# Patient Record
Sex: FEMALE | Race: WHITE | Employment: FULL TIME | URBAN - NONMETROPOLITAN AREA
[De-identification: names, ages, dates, MRNs, and addresses within clinical notes are randomized per-mention and may not be internally consistent; named-entity substitution may affect disease eponyms.]

---

## 2023-05-25 LAB
EXTERNAL CHLAMYDIA SCREEN: NEGATIVE
EXTERNAL GONORRHEA SCREEN: NEGATIVE
EXTERNAL HEPATITIS B SURFACE ANTIGEN: NEGATIVE
EXTERNAL RUBELLA QUALITATIVE: NORMAL
HIV1 P24 AG SERPL QL IA: NEGATIVE

## 2023-08-23 ENCOUNTER — HOSPITAL ENCOUNTER (OUTPATIENT)
Age: 33
Discharge: HOME | End: 2023-08-23
Attending: NURSE PRACTITIONER | Admitting: NURSE PRACTITIONER
Payer: COMMERCIAL

## 2023-08-23 VITALS
TEMPERATURE: 97.3 F | BODY MASS INDEX: 28.34 KG/M2 | SYSTOLIC BLOOD PRESSURE: 111 MMHG | DIASTOLIC BLOOD PRESSURE: 65 MMHG | WEIGHT: 187 LBS | HEIGHT: 68 IN | RESPIRATION RATE: 16 BRPM | HEART RATE: 83 BPM | OXYGEN SATURATION: 99 %

## 2023-08-23 PROBLEM — R05.9 COUGH: Status: ACTIVE | Noted: 2023-08-23

## 2023-08-23 LAB
FLUAV AG NPH QL: NEGATIVE
FLUBV AG NPH QL: NEGATIVE
SARS-COV-2 RDRP RESP QL NAA+PROBE: NOT DETECTED

## 2023-08-23 PROCEDURE — 87804 INFLUENZA ASSAY W/OPTIC: CPT

## 2023-08-23 PROCEDURE — 87635 SARS-COV-2 COVID-19 AMP PRB: CPT

## 2023-08-23 PROCEDURE — 99212 OFFICE O/P EST SF 10 MIN: CPT

## 2023-08-24 NOTE — FLOWSHEET NOTE
Discharge instructions reviewed with patient, all questions answered, verbalized understanding. [facility] office phone number to provided for patient to establish care.

## 2023-08-24 NOTE — FLOWSHEET NOTE
Pt here with complaints of congestion and abdominal pain when she coughs. Pt denies any contractions, leaking of fluid or vaginal bleeding, and states she has an anterior placenta so she doesn't feel baby move as much as her other pregnancy. Pt denies any current pain and says she just wanted to get everything checked out since there was a pain in lower abd when she coughed at home. Report called to Hanane Valverde CNM. List of over the counter medications provided for congestion and cough.

## 2023-08-24 NOTE — DISCHARGE INSTRUCTIONS
LABOR AND DELIVERY - OBSERVATION DISCHARGE INSTRUCTIONS    TERM LABOR: RETURN TO HOSPITAL IF:  __There is a leaking or sudden gush of fluid from the vagina (note color, odor, time and amount of fluid)    __ You have bright red vaginal bleeding    __You begin to have regular contractions, occuring every *** minutes, each contraction lasting 45-60 seconds for one hour. Time contractions from beginning of one to the beginning of the next. True contractions have a regular rate and become progressively closer. They are not changed by position change and are usually more uncomfortable when walking. PRE-TERM LABOR  __Your gestational age is *** weeks: term pregnancy starts at 37 weeks. __Fill your prescription and take as directed. __Bed rest (left lying position is best). __Refrain from sexual intercourse until you see your physician again. __No heavy lifting or strenuous activity. RETURN TO HOSPITAL IF:  __Contractions occur 3-4 in any hour (every 10-15 minutes), maybe with or without pain. __Rhythmic or constant menstrual-like cramps  __Rhythmic or constant lower, dull backache may radiate to the sides or front. Increase or change in vaginal discharge, may be watery, pink, red or brown-tinged. PRE-ECLAMPSIA  __Bed rest, left side lying position  __Elevate legs while sitting  __Maintain quiet, peaceful environment  __Eat well-balanced meals, no added salt, avoid processed lunch meats, canned soups, potato chips, etc.    SYMPTOMS THAT REQUIRE PROMPT REPORTING:  __Rapid gain in weight  __Persistent or severe headache  __Visual disturbances (spots, blurred)  __Nausea/vomiting, with or without upper abdominal pain.   __Increase or persistent swelling (face puffiness, hands, legs, ankles)  __Decreased urinary output  __Drowsiness listlessness    NAUSEA/VOMITING HYPEREMESIS  __Eat small, frequent meals instead of three large meals  __Drink liquids (such as 7-up or ginger ale) between meals instead of with meals  __Eat a few crackers or toast before getting out of bed in the morning    URINARY TRACT INFECTION  __Fill your prescription and take as directed  __Drink 8-10 glasses of water, juice or decaffeinated beverages a day. __Take two regular strength Tylenol every 4 hours as needed for pain or fever (NO ASPIRIN PRODUCTS)  __Cleanse vaginal area from front to back  __Completely empty bladder and avoid postponing urination  __Notify your physician of elevated temperature      LOW LYING/MARGINAL PLACENTA PREVIA  __No sexual intercourse  __No douching or tampon use  __No heavy lifting or strenuous activity  __No long trips without physician's consent  __Limited activity for __________________    RETURN TO HOSPITAL IMMEDIATELY IF:  __Vaginal bleeding, usually bright red and painless. May be intermittent, may come in gushes or continuous. ABDOMINAL POST-TRAUMA INSTRUCTIONS  __Limited activity for __hours  __It is important to note baby activity, evidence on pre-term labor or bleeding and tight feeling abdomen  __If you notice a decrease in fetal movement , notify your physician  __Return to hospital IMMEDIATELY if vaginal bleeding starts, abdominal tenderness, or pain, a rigid/board-like appearance of abdomen    OTHER INSTRUCTIONS  _X_Make an appointment to see your attending physician as soon as possible to establish care.        NOTE: If you do not begin to feel better, or you have any questions, contact your physician or call [phone], or return to the hospital.

## 2023-09-24 ENCOUNTER — HOSPITAL ENCOUNTER (OUTPATIENT)
Facility: HOSPITAL | Age: 33
Discharge: HOME | End: 2023-09-24
Attending: OBSTETRICS & GYNECOLOGY | Admitting: OBSTETRICS & GYNECOLOGY
Payer: MEDICAID

## 2023-09-24 ENCOUNTER — HOSPITAL ENCOUNTER (EMERGENCY)
Facility: HOSPITAL | Age: 33
Discharge: STILL ADMITTED OR ADMINISTRATIVE | End: 2023-09-24

## 2023-09-24 VITALS
WEIGHT: 188 LBS | TEMPERATURE: 97.2 F | HEART RATE: 92 BPM | RESPIRATION RATE: 18 BRPM | HEIGHT: 68 IN | DIASTOLIC BLOOD PRESSURE: 67 MMHG | OXYGEN SATURATION: 97 % | BODY MASS INDEX: 28.49 KG/M2 | SYSTOLIC BLOOD PRESSURE: 109 MMHG

## 2023-09-24 PROCEDURE — G0463 HOSPITAL OUTPT CLINIC VISIT: HCPCS

## 2023-09-24 PROCEDURE — 59025 FETAL NON-STRESS TEST: CPT

## 2023-09-24 PROCEDURE — G0378 HOSPITAL OBSERVATION PER HR: HCPCS

## 2023-09-24 RX ORDER — PRENATAL VIT NO.126/IRON/FOLIC 28MG-0.8MG
TABLET ORAL DAILY
COMMUNITY

## 2023-09-24 NOTE — NURSING NOTE
[facility] Medical Screening Exam Score Card    2023              BOX A -  Checklist findings CRITERIA Score  BOX C -   Checklist 1st Exam Score CRITERIA   Headache  No Yes = 1 0  Dilation 0 0-3 cm = 1  4-7 cm = 2  8-10 cm = 3   Vomiting No Yes = 1 0  Effacement 0 Greater than 50% = 2        Membranes:  TIME:  ROM 0 Ruptured = 3  Greater than 12 hours = 5   Visual Difficulties No Yes = 1 0  Contractions        Frequency 0 Less than 5 minutes   = 2    Greater than 5 minutes = 1   Epigastric Pain no Yes = 1 0  Duration 0 Greater than 40 seconds = 2        Intensity 0 Strong = 1   TOTAL Box A 3 or more = physician or CNM exam 1  Pattern 0 Regular = 1   BOX B -   Checklist Findings CRITERIA Score  Urge to Push 0 Yes = 3   PARA Yes If in labor and   Para 11 plus = 1 0  Maternal Temp 0 Greater than 100.4 = 5   Duration last labor less than 3 hours No If in labor and   Yes = 3 0  Maternal BP 0 Greater than 140/90 = 5  OR Less than 90/50 = 5   Prior  Yes If in labor and  Yes = 1 1  Maternal Respiration 0 Less than 12 = 2  OR  Greater than 20 = 2   Prenatal Care Yes If in labor and  No = 3 0            Significant Medical History 0 Yes = 7   Prior Fetal Demise No If in labor and  Yes = 3 0  Maternal Trauma 0 Yes = 10        [place] Bleeding 0 Yes = 7   Multiple Gestation No If in labor and  Yes = 3 0  Fetal Heart Rate 0 Baseline less than 120 = 5    OR Greater than 160 = 5    Non-reassuring strip = 10   Cerclage, Incompetent Cervix/Uterus No If in labor and  Yes = 3 0       Urinalysis No If greater than  100 = 1 0  Fetal Position 0 Non-vertex = 3  Prolapsed part = 10   Gestational Age Yes 20-34 weeks = 3    34-36 weeks = 2    37 plus weeks = 0 3  Fetal Station 0 Greater than 0 station = 3        TOTAL Box C 0 10 or more = physician or CNM exam   TOTAL Box B 6 or more = physician or CNM exam 5         Reviewed with obstetrician and orders received.      Anamaria Pradhan RN  2023  11:37 CDT

## 2023-09-24 NOTE — NURSING NOTE
Received with c/o decreased fm since 09/22/24 at 2100. FHT's 130's on external monitor. C/O pulled muscle Sept that is still sore at left lower rib edge.

## 2023-09-24 NOTE — NURSING NOTE
Alie Cleary, a  at 29w6d with an XENA of 2023, by Patient Reported, was seen at [facility] LABOR DELIVERY for a nonstress test.    Chief Complaint   Patient presents with    Decreased Fetal Movement     Hasn't felt baby move since Friday at 2100   States feeling baby move frequently after being placed on external monitors. Using electronic marker to sunshine fetal movements    There is no problem list on file for this patient.      Start Time: 1023  Stop Time: 1043    Interpretation A  Nonstress Test Interpretation A: Reactive  Comments A: verified by CAT Pradhan RNC VALERIE Rose RNC

## 2023-09-25 PROBLEM — Z34.90 PREGNANCY: Status: ACTIVE | Noted: 2023-09-25

## 2023-09-29 ENCOUNTER — INITIAL PRENATAL (OUTPATIENT)
Dept: OBSTETRICS AND GYNECOLOGY | Facility: CLINIC | Age: 33
End: 2023-09-29
Payer: MEDICAID

## 2023-09-29 VITALS — DIASTOLIC BLOOD PRESSURE: 68 MMHG | WEIGHT: 186 LBS | SYSTOLIC BLOOD PRESSURE: 104 MMHG | BODY MASS INDEX: 28.28 KG/M2

## 2023-09-29 DIAGNOSIS — O34.219 PREVIOUS CESAREAN DELIVERY, ANTEPARTUM: Primary | ICD-10-CM

## 2023-09-29 NOTE — PROGRESS NOTES
Transfer of care from [place], will request records  Prior delivery by , plans repeat  Good fetal movement  Mild reflux  Glucola and Hgb today  Offered flu vaccine  Schedule repeat  next visit ()  Tdap next visit   labor precautions    Diagnoses and all orders for this visit:    1. Previous  delivery, antepartum (Primary)  -     Gestational Screen 1 Hr ([facility])  -     Hemoglobin

## 2023-09-30 LAB
GLUCOSE 1H P 50 G GLC PO SERPL-MCNC: 127 MG/DL (ref 70–139)
HGB BLD-MCNC: 11.1 G/DL (ref 11.1–15.9)

## 2023-10-02 ENCOUNTER — REFERRAL TRIAGE (OUTPATIENT)
Dept: LABOR AND DELIVERY | Facility: HOSPITAL | Age: 33
End: 2023-10-02
Payer: MEDICAID

## 2023-10-02 ENCOUNTER — PATIENT OUTREACH (OUTPATIENT)
Dept: LABOR AND DELIVERY | Facility: HOSPITAL | Age: 33
End: 2023-10-02
Payer: MEDICAID

## 2023-10-02 NOTE — OUTREACH NOTE
[facility]  Unable to Reach       Questions/Answers      Flowsheet Row Responses   Pending Outreach Confirm Patient Interest   Call Attempt First   Outcome No answer/busy, Left message, [employer] message sent to patient                Sera Mcnally RN  Maternity Nurse Navigator    10/2/2023, 10:33 CDT

## 2023-10-02 NOTE — OUTREACH NOTE
[facility]  Enrollment    Current Estimated Gestational Age: 31w0d    Questions/Answers      Flowsheet Row Responses   Would like to participate? Yes   Date of Intake Visit 10/02/23            [facility]  Intake    Current Estimated Gestational Age: 31w0d    Intake Assessment      Flowsheet Row Responses   Best Method for Contacting Cell   Currently Employed Yes   Able to keep appointments as scheduled Yes   Gender(s) and Name(s) female   Do you have a dentist? No  [dental clinics sent in resource letter]   Resources Presently Utilizing: None   Maternal Warning Signs Provided  [attached to resource letter]   Other: Provided  [prenatal education sent in AVS from [employer]]   Other Education HANDS, How to find a dentist, How to find a pediatrician, How to find a primary care provider, Insurance benefits/Incentives, Meds to Beds, Mental Health Services, Smoking/Vaping Cessation, SNAP Benefits, Substance Use Disorder Treatment, Transportation Assistance, WIC Benefits            Learning Assessment      Flowsheet Row Responses   Relationship Patient   Does the learner have any barriers to learning? No Barriers   What is the preferred language of the learner for medical teaching? English   How does the learner prefer to learn new concepts? Listening, Reading, Demonstration, Pictures/Video            SDOH updated and reviewed with the patient during this program:  Financial Resource Strain: Medium Risk    Difficulty of Paying Living Expenses: Somewhat hard      Physical Activity: Insufficiently Active    Days of Exercise per Week: 5 days    Minutes of Exercise per Session: 10 min      Food Insecurity: Food Insecurity Present    Worried About Running Out of Food in the Last Year: Often true    Ran Out of Food in the Last Year: Often true      Social Connections: Not on file      Transportation Needs: No Transportation Needs    Lack of Transportation (Medical): No    Lack of Transportation (Non-Medical):  No      Housing Stability: Not on file      Stress: Not on file   Resource letter sent to client in [facility]       Referral submitted to the following resources (verbal consent received to submit demographic information):     HANDS    Tobacco, Alcohol, and Drug History     reports that she quit smoking about 7 months ago. Her smoking use included cigarettes. She has a 4.00 pack-year smoking history. She has never used smokeless tobacco.   reports that she does not currently use alcohol.   reports that she does not currently use drugs after having used the following drugs: Marijuana.    Sera Mcnally RN  Maternity Nurse Navigator    10/2/2023, 13:43 CDT            Sera Mcnally RN  Maternity Nurse Navigator    10/2/2023, 13:43 CDT

## 2023-10-11 ENCOUNTER — ROUTINE PRENATAL (OUTPATIENT)
Dept: OBSTETRICS AND GYNECOLOGY | Facility: CLINIC | Age: 33
End: 2023-10-11
Payer: MEDICAID

## 2023-10-11 VITALS — WEIGHT: 186 LBS | SYSTOLIC BLOOD PRESSURE: 106 MMHG | DIASTOLIC BLOOD PRESSURE: 64 MMHG | BODY MASS INDEX: 28.28 KG/M2

## 2023-10-11 DIAGNOSIS — Z71.85 IMMUNIZATION COUNSELING: ICD-10-CM

## 2023-10-11 DIAGNOSIS — Z3A.32 32 WEEKS GESTATION OF PREGNANCY: Primary | ICD-10-CM

## 2023-10-11 DIAGNOSIS — Z34.83 MULTIGRAVIDA IN THIRD TRIMESTER: ICD-10-CM

## 2023-10-11 DIAGNOSIS — O34.219 PREVIOUS CESAREAN DELIVERY, ANTEPARTUM: ICD-10-CM

## 2023-10-11 LAB
GLUCOSE UR STRIP-MCNC: NEGATIVE MG/DL
PROT UR STRIP-MCNC: NEGATIVE MG/DL

## 2023-10-11 NOTE — PROGRESS NOTES
Reason for visit: Routine OB visit at 32w2d     CC:  Patient reports good fetal movement. Denies VB, LOF, contractions, h/a, visual changes, and right upper quadrant pain.     ROS: All systems reviewed and are negative    Wt 186 lb for a TWG of Not found., /64, FHTs 132, FH 32 cm  Urine today and reviewed: negative glucose, negative protein      Exam:  General Appearance:  Healthy appearing . Normal mood and behavior.  HEENT: NCAT, EOMI  HR str and reg. Lungs clear. Resp even and unlabored.  Abdomen is soft and nontender. Uterus is consistent with EGA  Ext: no edema, nontender, no trauma, or cyanosis.    Impression  Diagnoses and all orders for this visit:    1. 32 weeks gestation of pregnancy (Primary)  -     POC Urinalysis Dipstick    2. Multigravida in third trimester  - Discussed third trimester of pregnancy, discomforts and measures of support, fetal movement counts,  labor warnings, preeclampsia warnings, and signs and symptoms to report. Third Trimester of Pregnancy video included in the AVS.  - Discussed and encouraged to come to the hospital or call with vaginal bleeding, leaking of fluid, contractions, or other concerns.  - Return to the office in two weeks for routine OBV with Dr. Rios and as needed with concerns.    3. Previous  delivery, antepartum    4. Immunization counseling  - Discussed Tdap immunization recommendations during pregnancy. Patient declines the Tdap immunization during this clinic visit. Tdap (Tetanus, Diptheria, Pertussis) Vaccine: What You Need to Know (VIS) education included in the AVS.  - Discussed influenza vaccine recommendations during pregnancy. Patient declines to receive the influenza immunization today in the clinic. Influenza (Flu) Vaccine (Inactivated or Recombinant): What You Need to Know (VIS) education included in the AVS.          This note has been signed electronically.    Deandra Jimenez, DNP, APRN, CNM, RNC-OB

## 2023-10-25 ENCOUNTER — TELEPHONE (OUTPATIENT)
Dept: OBSTETRICS AND GYNECOLOGY | Facility: CLINIC | Age: 33
End: 2023-10-25
Payer: MEDICAID

## 2023-10-25 NOTE — TELEPHONE ENCOUNTER
Pt called regarding problems with heartburn/acid reflux. Pt states she has tried OTC Tums with no relief. Pt advised that she can take OTC Zantac or Pepcid. Pt voiced understanding and will discuss further with Dr. Rios if needed at her appt on 10/27/23

## 2023-10-27 ENCOUNTER — ROUTINE PRENATAL (OUTPATIENT)
Dept: OBSTETRICS AND GYNECOLOGY | Facility: CLINIC | Age: 33
End: 2023-10-27
Payer: MEDICAID

## 2023-10-27 VITALS — DIASTOLIC BLOOD PRESSURE: 84 MMHG | WEIGHT: 188 LBS | SYSTOLIC BLOOD PRESSURE: 112 MMHG | BODY MASS INDEX: 28.59 KG/M2

## 2023-10-27 DIAGNOSIS — O34.219 PREVIOUS CESAREAN DELIVERY, ANTEPARTUM: Primary | ICD-10-CM

## 2023-10-27 PROCEDURE — 99213 OFFICE O/P EST LOW 20 MIN: CPT | Performed by: OBSTETRICS & GYNECOLOGY

## 2023-10-27 NOTE — PROGRESS NOTES
Good fetal movement  Labor precautions  Declines flu vaccine  Tubal papers today  Schedule repeat  and BTL   Surg pack next visit    Diagnoses and all orders for this visit:    1. Previous  delivery, antepartum (Primary)  -     Case Request

## 2023-11-06 ENCOUNTER — PATIENT OUTREACH (OUTPATIENT)
Dept: LABOR AND DELIVERY | Facility: HOSPITAL | Age: 33
End: 2023-11-06
Payer: MEDICAID

## 2023-11-06 NOTE — OUTREACH NOTE
[facility]  Unable to Reach       Questions/Answers      Flowsheet Row Responses   Pending Outreach Prenatal Check-in   Call Attempt First   Outcome No answer/busy, [employer] message sent to patient            EPDS sent in [employer] questionnaire prior to check-in call this week.     Sera Mcnally RN  Maternity Nurse Navigator    11/6/2023, 12:10 CST

## 2023-11-08 ENCOUNTER — PATIENT OUTREACH (OUTPATIENT)
Dept: LABOR AND DELIVERY | Facility: HOSPITAL | Age: 33
End: 2023-11-08
Payer: MEDICAID

## 2023-11-08 NOTE — OUTREACH NOTE
[facility]  Unable to Reach       Questions/Answers      Flowsheet Row Responses   Pending Outreach Prenatal Check-in   Call Attempt First   Outcome No answer/busy, Left message                Sera Mcnally RN  Maternity Nurse Navigator    11/8/2023, 11:06 CST

## 2023-11-09 ENCOUNTER — PATIENT OUTREACH (OUTPATIENT)
Dept: LABOR AND DELIVERY | Facility: HOSPITAL | Age: 33
End: 2023-11-09
Payer: MEDICAID

## 2023-11-09 NOTE — OUTREACH NOTE
[facility]  Check-In    Current Estimated Gestational Age: 36w3d      Questions/Answers      Flowsheet Row Responses   Best Method for Contacting Cell   Currently Employed Yes   Able to keep appointments as scheduled Yes   Gender(s) and Name(s) female   Baby Active/Feeling Fetal Movemen Yes   How are you presently feeling? voices no complaints   Supplies ready for baby Breast Pump, Car Seat, Clothing, Crib, Diapers, Feeding Supplies   Resource/Environmental Concerns None   Do you have any questions related to your care experience, your pregnancy, plans for delivery, any concerns, etc? No          Resource letter sent to client in [facility] with the following resources attached:  Maternal warning signs  [place] care  [place] safety  1 in 5 rule of uterine contractions    Sera Mcnally RN  Maternity Nurse Navigator    2023, 15:06 CST

## 2023-11-10 ENCOUNTER — ROUTINE PRENATAL (OUTPATIENT)
Dept: OBSTETRICS AND GYNECOLOGY | Facility: CLINIC | Age: 33
End: 2023-11-10
Payer: MEDICAID

## 2023-11-10 VITALS — BODY MASS INDEX: 29.5 KG/M2 | WEIGHT: 194 LBS | DIASTOLIC BLOOD PRESSURE: 74 MMHG | SYSTOLIC BLOOD PRESSURE: 118 MMHG

## 2023-11-10 DIAGNOSIS — Z3A.36 36 WEEKS GESTATION OF PREGNANCY: Primary | ICD-10-CM

## 2023-11-10 DIAGNOSIS — Z34.83 MULTIGRAVIDA IN THIRD TRIMESTER: ICD-10-CM

## 2023-11-10 DIAGNOSIS — O34.219 PREVIOUS CESAREAN DELIVERY, ANTEPARTUM: ICD-10-CM

## 2023-11-10 DIAGNOSIS — R12 HEARTBURN DURING PREGNANCY IN THIRD TRIMESTER: ICD-10-CM

## 2023-11-10 DIAGNOSIS — O26.893 HEARTBURN DURING PREGNANCY IN THIRD TRIMESTER: ICD-10-CM

## 2023-11-10 LAB
GLUCOSE UR STRIP-MCNC: NEGATIVE MG/DL
PROT UR STRIP-MCNC: ABNORMAL MG/DL

## 2023-11-10 RX ORDER — PANTOPRAZOLE SODIUM 40 MG/1
40 TABLET, DELAYED RELEASE ORAL DAILY
Qty: 30 TABLET | Refills: 1 | Status: ON HOLD | OUTPATIENT
Start: 2023-11-10

## 2023-11-10 NOTE — PROGRESS NOTES
Reason for visit: Routine OB visit at 36w4d     CC:  Patient reports good fetal movement. Complaining of bad heartburn. She reports the over the counter medication is not working. Denies VB, LOF, contractions, h/a, visual changes, and right upper quadrant pain.     ROS: All systems reviewed and are negative with exception of the following: heartburn    Wt 194 lb for a TWG of Not found., /74, FHTs 141, FH 37 cm  Urine today and reviewed: Negative glucose, trace protein    Exam:  General Appearance:  Healthy appearing . Normal mood and behavior.  HEENT: NCAT, EOMI  HR str and reg. Lungs clear. Resp even and unlabored.  Abdomen is soft and nontender. Uterus is consistent with EGA  Ext: No edema, nontender, no trauma, or cyanosis.    Impression  Diagnoses and all orders for this visit:    1. 36 weeks gestation of pregnancy (Primary)  -     POC Urinalysis Dipstick    2. Multigravida in third trimester  - Discussed third trimester discomforts and measures of support, fetal movement counts, signs of labor, preeclampsia warnings, and signs and symptoms to report. Signs and Symptoms of Labor and video included in the AVS.  - Discussed and encouraged to come to the hospital or call with vaginal bleeding, leaking of fluid, regular contractions, or other concerns.  - Return to the office in one week for routine OBV with Dr. Rios and as needed with concerns.    3. Previous  delivery, antepartum  - Planning for repeat  delivery with bilateral salpingectomy. ERAS and hibiclens shower education provided. Handouts provided. Preparing for  Delivery video included in the AVS.     4. Heartburn during pregnancy in third trimester  - Discussed measures of support, including reflux precautions and prescription medication. She relates the Pepcid and Tums have become ineffective.   -     pantoprazole (Protonix) 40 MG EC tablet; Take 1 tablet by mouth Daily.  Dispense: 30 tablet; Refill: 1           This note has been signed electronically.    Deandra Jimenez, DNP, APRN, CNM, RNC-OB

## 2023-11-21 ENCOUNTER — ROUTINE PRENATAL (OUTPATIENT)
Dept: OBSTETRICS AND GYNECOLOGY | Facility: CLINIC | Age: 33
End: 2023-11-21
Payer: MEDICAID

## 2023-11-21 VITALS — WEIGHT: 190 LBS | SYSTOLIC BLOOD PRESSURE: 114 MMHG | BODY MASS INDEX: 28.89 KG/M2 | DIASTOLIC BLOOD PRESSURE: 78 MMHG

## 2023-11-21 DIAGNOSIS — O34.219 PREVIOUS CESAREAN DELIVERY, ANTEPARTUM: Primary | ICD-10-CM

## 2023-11-21 RX ORDER — ACETAMINOPHEN 500 MG
1000 TABLET ORAL ONCE
Status: CANCELLED | OUTPATIENT
Start: 2023-11-21 | End: 2023-11-21

## 2023-11-21 RX ORDER — HYDROMORPHONE HYDROCHLORIDE 1 MG/ML
0.5 INJECTION, SOLUTION INTRAMUSCULAR; INTRAVENOUS; SUBCUTANEOUS
Status: CANCELLED | OUTPATIENT
Start: 2023-11-21 | End: 2023-12-01

## 2023-11-21 RX ORDER — CARBOPROST TROMETHAMINE 250 UG/ML
250 INJECTION, SOLUTION INTRAMUSCULAR AS NEEDED
Status: CANCELLED | OUTPATIENT
Start: 2023-11-21

## 2023-11-21 RX ORDER — KETOROLAC TROMETHAMINE 15 MG/ML
30 INJECTION, SOLUTION INTRAMUSCULAR; INTRAVENOUS ONCE
Status: CANCELLED | OUTPATIENT
Start: 2023-11-21 | End: 2023-11-21

## 2023-11-21 RX ORDER — METHYLERGONOVINE MALEATE 0.2 MG/ML
200 INJECTION INTRAVENOUS ONCE AS NEEDED
Status: CANCELLED | OUTPATIENT
Start: 2023-11-21

## 2023-11-21 RX ORDER — FAMOTIDINE 10 MG/ML
20 INJECTION, SOLUTION INTRAVENOUS ONCE AS NEEDED
Status: CANCELLED | OUTPATIENT
Start: 2023-11-21

## 2023-11-21 RX ORDER — SODIUM CHLORIDE 9 MG/ML
40 INJECTION, SOLUTION INTRAVENOUS AS NEEDED
Status: CANCELLED | OUTPATIENT
Start: 2023-11-21

## 2023-11-21 RX ORDER — OXYTOCIN/0.9 % SODIUM CHLORIDE 30/500 ML
250 PLASTIC BAG, INJECTION (ML) INTRAVENOUS CONTINUOUS
Status: CANCELLED | OUTPATIENT
Start: 2023-11-21 | End: 2023-11-21

## 2023-11-21 RX ORDER — MISOPROSTOL 100 UG/1
800 TABLET ORAL ONCE AS NEEDED
Status: CANCELLED | OUTPATIENT
Start: 2023-11-21

## 2023-11-21 RX ORDER — OXYTOCIN/0.9 % SODIUM CHLORIDE 30/500 ML
999 PLASTIC BAG, INJECTION (ML) INTRAVENOUS ONCE
Status: CANCELLED | OUTPATIENT
Start: 2023-11-21

## 2023-11-21 RX ORDER — SODIUM CHLORIDE 0.9 % (FLUSH) 0.9 %
10 SYRINGE (ML) INJECTION AS NEEDED
Status: CANCELLED | OUTPATIENT
Start: 2023-11-21

## 2023-11-21 RX ORDER — FAMOTIDINE 10 MG
20 TABLET ORAL ONCE AS NEEDED
Status: CANCELLED | OUTPATIENT
Start: 2023-11-21

## 2023-11-21 RX ORDER — SODIUM CHLORIDE 0.9 % (FLUSH) 0.9 %
10 SYRINGE (ML) INJECTION EVERY 12 HOURS SCHEDULED
Status: CANCELLED | OUTPATIENT
Start: 2023-11-21

## 2023-11-21 RX ORDER — ONDANSETRON 2 MG/ML
4 INJECTION INTRAMUSCULAR; INTRAVENOUS EVERY 6 HOURS PRN
Status: CANCELLED | OUTPATIENT
Start: 2023-11-21

## 2023-11-21 RX ORDER — ONDANSETRON 4 MG/1
4 TABLET, FILM COATED ORAL EVERY 6 HOURS PRN
Status: CANCELLED | OUTPATIENT
Start: 2023-11-21

## 2023-11-21 RX ORDER — SODIUM CHLORIDE, SODIUM LACTATE, POTASSIUM CHLORIDE, CALCIUM CHLORIDE 600; 310; 30; 20 MG/100ML; MG/100ML; MG/100ML; MG/100ML
125 INJECTION, SOLUTION INTRAVENOUS CONTINUOUS
Status: CANCELLED | OUTPATIENT
Start: 2023-11-21

## 2023-11-21 NOTE — H&P
[facility]  Alie Cleary  : 1990  MRN: [id number]  CSN: [id number]    History and Physical    Subjective   Alie Cleary is a 33 y.o. year old  with an Estimated Date of Delivery: 23 scheduled on  for  delivery due to previous  section declines .  She is planning for sterilization at the time of the .    Prenatal care has been with Dr. Kenney Rios.  It has been benign.    OB History    Para Term  AB Living   4 1 1 0 2 1   SAB IAB Ectopic Molar Multiple Live Births   2 0 0 0 0 1      # Outcome Date GA Lbr Yordy/2nd Weight Sex Delivery Anes PTL Lv   4 Current            3 SAB 2017 3w0d          2 Term 09 42w0d   M CS-Unspec EPI N JOEY      Complications: Fetal Intolerance   1 SAB 08 8w0d      N      Past Medical History:   Diagnosis Date    Fibromyalgia      Past Surgical History:   Procedure Laterality Date     SECTION      MOUTH SURGERY         Current Outpatient Medications:     pantoprazole (Protonix) 40 MG EC tablet, Take 1 tablet by mouth Daily., Disp: 30 tablet, Rfl: 1    prenatal vitamin (prenatal, CLASSIC, vitamin) tablet, Take  by mouth Daily., Disp: , Rfl:   No family history on file.    No Known Allergies  Social History    Tobacco Use      Smoking status: Former        Packs/day: 0.25        Years: 16.00        Additional pack years: 0.00        Total pack years: 4.00        Types: Cigarettes        Quit date: 2023        Years since quittin.7      Smokeless tobacco: Never    Review of Systems      Objective   /78   Wt 86.2 kg (190 lb)   BMI 28.89 kg/m²   General: well developed; well nourished  no acute distress   Heart: regular rate and rhythm   Lungs: breathing is unlabored   Abdomen: soft, non-tender; no masses     Cervix:   presenting part vertex  Prenatal Labs  Lab Results   Component Value Date    HGB 11.1 2023       Recent Labs  Lab Results   Component Value Date    HGB 11.1  2023    HCT 40.3 2020    WBC 13.2 (H) 2020     2020           Assessment   IUP with an Estimated Date of Delivery: 23  Planned  section on  for previous  section declines .  Undesired fertility     Plan   Repeat  with bilateral tubal ligation      Risks, benefits, and alternatives to  section were discussed with the patient at  length.  The surgical nature of  section was discussed.  The indications for  section were discussed.  Risks of bleeding, infection, and damage to surrounding organs were reviewed.  Injury to blood vessels, the urinary bladder, the ureter, and the intestines were all reviewed.  Management of these complications were reviewed.    Risks, benefits, and alternatives of permanent sterilization were discussed with the patient in detail. Intraoperative risks of bleeding and damage to surrounding organs, including but not limited to intestine, bladder and ureter, were explained. Management of these were also explained. Postoperative complications such as infection, pneumonia, DVT, and bleeding were explained. The importance of compliance with postoperative restrictions was discussed.  Success and failure rates were discussed. Increased risk of ectopic pregnancy was explained. In addition, reversible forms of contraception were reviewed such as the pill, the patch, the shot, and the IUD.     All of the patient's questions were answered to her satisfaction. She was encouraged to return for an additional appointment if she had further questions. She verbalized understanding of the above and wished to proceed with the outlined plan.      Corky Rios MD  2023

## 2023-11-21 NOTE — H&P (VIEW-ONLY)
[facility]  Alie Cleary  : 1990  MRN: [id number]  CSN: [id number]    History and Physical    Subjective   Alie Celary is a 33 y.o. year old  with an Estimated Date of Delivery: 23 scheduled on  for  delivery due to previous  section declines .  She is planning for sterilization at the time of the .    Prenatal care has been with Dr. Kenney Rios.  It has been benign.    OB History    Para Term  AB Living   4 1 1 0 2 1   SAB IAB Ectopic Molar Multiple Live Births   2 0 0 0 0 1      # Outcome Date GA Lbr Yordy/2nd Weight Sex Delivery Anes PTL Lv   4 Current            3 SAB 2017 3w0d          2 Term 09 42w0d   M CS-Unspec EPI N JOEY      Complications: Fetal Intolerance   1 SAB 08 8w0d      N      Past Medical History:   Diagnosis Date    Fibromyalgia      Past Surgical History:   Procedure Laterality Date     SECTION      MOUTH SURGERY         Current Outpatient Medications:     pantoprazole (Protonix) 40 MG EC tablet, Take 1 tablet by mouth Daily., Disp: 30 tablet, Rfl: 1    prenatal vitamin (prenatal, CLASSIC, vitamin) tablet, Take  by mouth Daily., Disp: , Rfl:   No family history on file.    No Known Allergies  Social History    Tobacco Use      Smoking status: Former        Packs/day: 0.25        Years: 16.00        Additional pack years: 0.00        Total pack years: 4.00        Types: Cigarettes        Quit date: 2023        Years since quittin.7      Smokeless tobacco: Never    Review of Systems      Objective   /78   Wt 86.2 kg (190 lb)   BMI 28.89 kg/m²   General: well developed; well nourished  no acute distress   Heart: regular rate and rhythm   Lungs: breathing is unlabored   Abdomen: soft, non-tender; no masses     Cervix:   presenting part vertex  Prenatal Labs  Lab Results   Component Value Date    HGB 11.1 2023       Recent Labs  Lab Results   Component Value Date    HGB 11.1  2023    HCT 40.3 2020    WBC 13.2 (H) 2020     2020           Assessment   IUP with an Estimated Date of Delivery: 23  Planned  section on  for previous  section declines .  Undesired fertility     Plan   Repeat  with bilateral tubal ligation      Risks, benefits, and alternatives to  section were discussed with the patient at  length.  The surgical nature of  section was discussed.  The indications for  section were discussed.  Risks of bleeding, infection, and damage to surrounding organs were reviewed.  Injury to blood vessels, the urinary bladder, the ureter, and the intestines were all reviewed.  Management of these complications were reviewed.    Risks, benefits, and alternatives of permanent sterilization were discussed with the patient in detail. Intraoperative risks of bleeding and damage to surrounding organs, including but not limited to intestine, bladder and ureter, were explained. Management of these were also explained. Postoperative complications such as infection, pneumonia, DVT, and bleeding were explained. The importance of compliance with postoperative restrictions was discussed.  Success and failure rates were discussed. Increased risk of ectopic pregnancy was explained. In addition, reversible forms of contraception were reviewed such as the pill, the patch, the shot, and the IUD.     All of the patient's questions were answered to her satisfaction. She was encouraged to return for an additional appointment if she had further questions. She verbalized understanding of the above and wished to proceed with the outlined plan.      Corky Rios MD  2023

## 2023-11-21 NOTE — PROGRESS NOTES
Good fetal movement  Just started Protonix for reflux  Has had a few contractions  Surg pack done  Labor instructions    Diagnoses and all orders for this visit:    1. Previous  delivery, antepartum (Primary)

## 2023-11-27 ENCOUNTER — HOSPITAL ENCOUNTER (OUTPATIENT)
Facility: HOSPITAL | Age: 33
Discharge: HOME | End: 2023-11-27
Attending: OBSTETRICS & GYNECOLOGY | Admitting: OBSTETRICS & GYNECOLOGY
Payer: MEDICAID

## 2023-11-27 ENCOUNTER — ANESTHESIA (OUTPATIENT)
Dept: LABOR AND DELIVERY | Facility: HOSPITAL | Age: 33
End: 2023-11-27
Payer: MEDICAID

## 2023-11-27 ENCOUNTER — ANESTHESIA EVENT (OUTPATIENT)
Dept: LABOR AND DELIVERY | Facility: HOSPITAL | Age: 33
End: 2023-11-27
Payer: MEDICAID

## 2023-11-27 ENCOUNTER — HOSPITAL ENCOUNTER (INPATIENT)
Facility: HOSPITAL | Age: 33
LOS: 4 days | Discharge: HOME | End: 2023-12-01
Attending: OBSTETRICS & GYNECOLOGY | Admitting: OBSTETRICS & GYNECOLOGY
Payer: MEDICAID

## 2023-11-27 VITALS
DIASTOLIC BLOOD PRESSURE: 71 MMHG | BODY MASS INDEX: 29.55 KG/M2 | WEIGHT: 195 LBS | HEART RATE: 97 BPM | SYSTOLIC BLOOD PRESSURE: 124 MMHG | TEMPERATURE: 97.4 F | HEIGHT: 68 IN | RESPIRATION RATE: 18 BRPM

## 2023-11-27 DIAGNOSIS — G89.18 PAIN FOLLOWING CESAREAN DELIVERY: Primary | ICD-10-CM

## 2023-11-27 DIAGNOSIS — O34.219 PREVIOUS CESAREAN DELIVERY, ANTEPARTUM: ICD-10-CM

## 2023-11-27 PROBLEM — Z98.891 PREVIOUS CESAREAN SECTION: Status: ACTIVE | Noted: 2023-11-27

## 2023-11-27 PROBLEM — Z37.9 NORMAL LABOR: Status: ACTIVE | Noted: 2023-11-27

## 2023-11-27 LAB
ABO GROUP BLD: NORMAL
AMPHET+METHAMPHET UR QL: NEGATIVE
AMPHETAMINES UR QL: NEGATIVE
BARBITURATES UR QL SCN: NEGATIVE
BENZODIAZ UR QL SCN: NEGATIVE
BLD GP AB SCN SERPL QL: NEGATIVE
BUPRENORPHINE SERPL-MCNC: NEGATIVE NG/ML
CANNABINOIDS SERPL QL: NEGATIVE
COCAINE UR QL: NEGATIVE
DEPRECATED RDW RBC AUTO: 43.9 FL (ref 37–54)
ERYTHROCYTE [DISTWIDTH] IN BLOOD BY AUTOMATED COUNT: 14.2 % (ref 12.3–15.4)
FENTANYL UR-MCNC: NEGATIVE NG/ML
HCT VFR BLD AUTO: 33.5 % (ref 34–46.6)
HGB BLD-MCNC: 10.4 G/DL (ref 12–15.9)
MCH RBC QN AUTO: 26.6 PG (ref 26.6–33)
MCHC RBC AUTO-ENTMCNC: 31 G/DL (ref 31.5–35.7)
MCV RBC AUTO: 85.7 FL (ref 79–97)
METHADONE UR QL SCN: NEGATIVE
OPIATES UR QL: NEGATIVE
OXYCODONE UR QL SCN: NEGATIVE
PCP UR QL SCN: NEGATIVE
PLATELET # BLD AUTO: 303 10*3/MM3 (ref 140–450)
PMV BLD AUTO: 10.5 FL (ref 6–12)
RBC # BLD AUTO: 3.91 10*6/MM3 (ref 3.77–5.28)
RH BLD: POSITIVE
T&S EXPIRATION DATE: NORMAL
TRICYCLICS UR QL SCN: NEGATIVE
WBC NRBC COR # BLD AUTO: 10.86 10*3/MM3 (ref 3.4–10.8)

## 2023-11-27 PROCEDURE — 25010000002 HYDROMORPHONE 1 MG/ML SOLUTION: Performed by: NURSE ANESTHETIST, CERTIFIED REGISTERED

## 2023-11-27 PROCEDURE — 86900 BLOOD TYPING SEROLOGIC ABO: CPT | Performed by: OBSTETRICS & GYNECOLOGY

## 2023-11-27 PROCEDURE — 86850 RBC ANTIBODY SCREEN: CPT | Performed by: OBSTETRICS & GYNECOLOGY

## 2023-11-27 PROCEDURE — 0UB70ZZ EXCISION OF BILATERAL FALLOPIAN TUBES, OPEN APPROACH: ICD-10-PCS | Performed by: OBSTETRICS & GYNECOLOGY

## 2023-11-27 PROCEDURE — 80307 DRUG TEST PRSMV CHEM ANLYZR: CPT | Performed by: OBSTETRICS & GYNECOLOGY

## 2023-11-27 PROCEDURE — G0463 HOSPITAL OUTPT CLINIC VISIT: HCPCS

## 2023-11-27 PROCEDURE — 25810000003 LACTATED RINGERS PER 1000 ML: Performed by: OBSTETRICS & GYNECOLOGY

## 2023-11-27 PROCEDURE — 25010000002 CEFAZOLIN PER 500 MG: Performed by: OBSTETRICS & GYNECOLOGY

## 2023-11-27 PROCEDURE — 86901 BLOOD TYPING SEROLOGIC RH(D): CPT | Performed by: OBSTETRICS & GYNECOLOGY

## 2023-11-27 PROCEDURE — 25810000003 LACTATED RINGERS SOLUTION: Performed by: NURSE ANESTHETIST, CERTIFIED REGISTERED

## 2023-11-27 PROCEDURE — 25010000002 METOCLOPRAMIDE PER 10 MG: Performed by: NURSE ANESTHETIST, CERTIFIED REGISTERED

## 2023-11-27 PROCEDURE — 59514 CESAREAN DELIVERY ONLY: CPT | Performed by: OBSTETRICS & GYNECOLOGY

## 2023-11-27 PROCEDURE — 25010000002 ONDANSETRON PER 1 MG: Performed by: NURSE ANESTHETIST, CERTIFIED REGISTERED

## 2023-11-27 PROCEDURE — 25010000002 BUPIVACAINE PF 0.75 % SOLUTION: Performed by: NURSE ANESTHETIST, CERTIFIED REGISTERED

## 2023-11-27 PROCEDURE — 25010000002 OXYTOCIN PER 10 UNITS: Performed by: NURSE ANESTHETIST, CERTIFIED REGISTERED

## 2023-11-27 PROCEDURE — 85027 COMPLETE CBC AUTOMATED: CPT | Performed by: OBSTETRICS & GYNECOLOGY

## 2023-11-27 PROCEDURE — 88307 TISSUE EXAM BY PATHOLOGIST: CPT | Performed by: OBSTETRICS & GYNECOLOGY

## 2023-11-27 PROCEDURE — 88302 TISSUE EXAM BY PATHOLOGIST: CPT | Performed by: OBSTETRICS & GYNECOLOGY

## 2023-11-27 PROCEDURE — 58611 LIGATE OVIDUCT(S) ADD-ON: CPT | Performed by: OBSTETRICS & GYNECOLOGY

## 2023-11-27 RX ORDER — SODIUM CHLORIDE 9 MG/ML
40 INJECTION, SOLUTION INTRAVENOUS AS NEEDED
Status: DISCONTINUED | OUTPATIENT
Start: 2023-11-27 | End: 2023-11-28 | Stop reason: HOSPADM

## 2023-11-27 RX ORDER — SODIUM CHLORIDE 0.9 % (FLUSH) 0.9 %
10 SYRINGE (ML) INJECTION AS NEEDED
Status: DISCONTINUED | OUTPATIENT
Start: 2023-11-27 | End: 2023-11-28 | Stop reason: HOSPADM

## 2023-11-27 RX ORDER — ONDANSETRON 2 MG/ML
4 INJECTION INTRAMUSCULAR; INTRAVENOUS EVERY 6 HOURS PRN
Status: DISCONTINUED | OUTPATIENT
Start: 2023-11-27 | End: 2023-11-28 | Stop reason: HOSPADM

## 2023-11-27 RX ORDER — SODIUM CHLORIDE 0.9 % (FLUSH) 0.9 %
10 SYRINGE (ML) INJECTION EVERY 12 HOURS SCHEDULED
Status: DISCONTINUED | OUTPATIENT
Start: 2023-11-27 | End: 2023-11-28 | Stop reason: HOSPADM

## 2023-11-27 RX ORDER — OXYTOCIN/0.9 % SODIUM CHLORIDE 30/500 ML
PLASTIC BAG, INJECTION (ML) INTRAVENOUS
Status: COMPLETED
Start: 2023-11-27 | End: 2023-11-27

## 2023-11-27 RX ORDER — KETOROLAC TROMETHAMINE 30 MG/ML
30 INJECTION, SOLUTION INTRAMUSCULAR; INTRAVENOUS ONCE
Status: COMPLETED | OUTPATIENT
Start: 2023-11-27 | End: 2023-11-28

## 2023-11-27 RX ORDER — ACETAMINOPHEN 500 MG
1000 TABLET ORAL ONCE
Status: COMPLETED | OUTPATIENT
Start: 2023-11-27 | End: 2023-11-27

## 2023-11-27 RX ORDER — ONDANSETRON 2 MG/ML
4 INJECTION INTRAMUSCULAR; INTRAVENOUS ONCE
Status: DISCONTINUED | OUTPATIENT
Start: 2023-11-27 | End: 2023-11-28 | Stop reason: HOSPADM

## 2023-11-27 RX ORDER — HYDROMORPHONE HYDROCHLORIDE 1 MG/ML
0.5 INJECTION, SOLUTION INTRAMUSCULAR; INTRAVENOUS; SUBCUTANEOUS
Status: DISCONTINUED | OUTPATIENT
Start: 2023-11-27 | End: 2023-11-28 | Stop reason: HOSPADM

## 2023-11-27 RX ORDER — OXYTOCIN/0.9 % SODIUM CHLORIDE 30/500 ML
250 PLASTIC BAG, INJECTION (ML) INTRAVENOUS CONTINUOUS
Status: ACTIVE | OUTPATIENT
Start: 2023-11-28 | End: 2023-11-28

## 2023-11-27 RX ORDER — EPHEDRINE SULFATE 50 MG/ML
INJECTION INTRAVENOUS AS NEEDED
Status: DISCONTINUED | OUTPATIENT
Start: 2023-11-27 | End: 2023-11-27 | Stop reason: SURG

## 2023-11-27 RX ORDER — BUPIVACAINE HYDROCHLORIDE 7.5 MG/ML
INJECTION, SOLUTION EPIDURAL; RETROBULBAR AS NEEDED
Status: DISCONTINUED | OUTPATIENT
Start: 2023-11-27 | End: 2023-11-27 | Stop reason: SURG

## 2023-11-27 RX ORDER — FAMOTIDINE 20 MG/1
20 TABLET, FILM COATED ORAL ONCE AS NEEDED
Status: DISCONTINUED | OUTPATIENT
Start: 2023-11-27 | End: 2023-11-28 | Stop reason: HOSPADM

## 2023-11-27 RX ORDER — CARBOPROST TROMETHAMINE 250 UG/ML
250 INJECTION, SOLUTION INTRAMUSCULAR AS NEEDED
Status: DISCONTINUED | OUTPATIENT
Start: 2023-11-27 | End: 2023-11-28 | Stop reason: HOSPADM

## 2023-11-27 RX ORDER — METOCLOPRAMIDE HYDROCHLORIDE 5 MG/ML
10 INJECTION INTRAMUSCULAR; INTRAVENOUS ONCE
Status: COMPLETED | OUTPATIENT
Start: 2023-11-27 | End: 2023-11-27

## 2023-11-27 RX ORDER — MISOPROSTOL 200 UG/1
800 TABLET ORAL ONCE AS NEEDED
Status: COMPLETED | OUTPATIENT
Start: 2023-11-27 | End: 2023-11-27

## 2023-11-27 RX ORDER — ACETAMINOPHEN 500 MG
1000 TABLET ORAL ONCE
Status: DISCONTINUED | OUTPATIENT
Start: 2023-11-27 | End: 2023-11-28 | Stop reason: HOSPADM

## 2023-11-27 RX ORDER — SODIUM CHLORIDE, SODIUM LACTATE, POTASSIUM CHLORIDE, CALCIUM CHLORIDE 600; 310; 30; 20 MG/100ML; MG/100ML; MG/100ML; MG/100ML
125 INJECTION, SOLUTION INTRAVENOUS CONTINUOUS
Status: DISCONTINUED | OUTPATIENT
Start: 2023-11-27 | End: 2023-11-28

## 2023-11-27 RX ORDER — ONDANSETRON 4 MG/1
4 TABLET, FILM COATED ORAL EVERY 6 HOURS PRN
Status: DISCONTINUED | OUTPATIENT
Start: 2023-11-27 | End: 2023-11-28 | Stop reason: HOSPADM

## 2023-11-27 RX ORDER — METHYLERGONOVINE MALEATE 0.2 MG/ML
200 INJECTION INTRAVENOUS ONCE AS NEEDED
Status: DISCONTINUED | OUTPATIENT
Start: 2023-11-27 | End: 2023-11-28 | Stop reason: HOSPADM

## 2023-11-27 RX ORDER — CITRIC ACID/SODIUM CITRATE 334-500MG
30 SOLUTION, ORAL ORAL ONCE
Status: COMPLETED | OUTPATIENT
Start: 2023-11-27 | End: 2023-11-27

## 2023-11-27 RX ORDER — FAMOTIDINE 10 MG/ML
20 INJECTION, SOLUTION INTRAVENOUS ONCE AS NEEDED
Status: DISCONTINUED | OUTPATIENT
Start: 2023-11-27 | End: 2023-11-28 | Stop reason: HOSPADM

## 2023-11-27 RX ORDER — ONDANSETRON 2 MG/ML
INJECTION INTRAMUSCULAR; INTRAVENOUS AS NEEDED
Status: DISCONTINUED | OUTPATIENT
Start: 2023-11-27 | End: 2023-11-27 | Stop reason: SURG

## 2023-11-27 RX ORDER — OXYTOCIN 10 [USP'U]/ML
INJECTION, SOLUTION INTRAMUSCULAR; INTRAVENOUS AS NEEDED
Status: DISCONTINUED | OUTPATIENT
Start: 2023-11-27 | End: 2023-11-27 | Stop reason: SURG

## 2023-11-27 RX ORDER — FAMOTIDINE 10 MG/ML
20 INJECTION, SOLUTION INTRAVENOUS ONCE
Status: COMPLETED | OUTPATIENT
Start: 2023-11-27 | End: 2023-11-27

## 2023-11-27 RX ORDER — OXYTOCIN/0.9 % SODIUM CHLORIDE 30/500 ML
999 PLASTIC BAG, INJECTION (ML) INTRAVENOUS ONCE
Status: DISCONTINUED | OUTPATIENT
Start: 2023-11-27 | End: 2023-11-28 | Stop reason: HOSPADM

## 2023-11-27 RX ADMIN — Medication 250 ML/HR: at 23:20

## 2023-11-27 RX ADMIN — ONDANSETRON 12 MG: 2 INJECTION INTRAMUSCULAR; INTRAVENOUS at 21:33

## 2023-11-27 RX ADMIN — FAMOTIDINE 20 MG: 10 INJECTION INTRAVENOUS at 21:26

## 2023-11-27 RX ADMIN — ACETAMINOPHEN TAB 500 MG 1000 MG: 500 TAB at 21:08

## 2023-11-27 RX ADMIN — HYDROMORPHONE HYDROCHLORIDE 900 MCG: 1 INJECTION, SOLUTION INTRAMUSCULAR; INTRAVENOUS; SUBCUTANEOUS at 22:17

## 2023-11-27 RX ADMIN — SODIUM CHLORIDE, POTASSIUM CHLORIDE, SODIUM LACTATE AND CALCIUM CHLORIDE: 600; 310; 30; 20 INJECTION, SOLUTION INTRAVENOUS at 21:33

## 2023-11-27 RX ADMIN — METOCLOPRAMIDE HYDROCHLORIDE 10 MG: 5 INJECTION INTRAMUSCULAR; INTRAVENOUS at 21:26

## 2023-11-27 RX ADMIN — OXYTOCIN 30 UNITS: 10 INJECTION INTRAVENOUS at 21:57

## 2023-11-27 RX ADMIN — MISOPROSTOL 800 MCG: 200 TABLET ORAL at 22:27

## 2023-11-27 RX ADMIN — SODIUM CHLORIDE, POTASSIUM CHLORIDE, SODIUM LACTATE AND CALCIUM CHLORIDE 1000 ML: 600; 310; 30; 20 INJECTION, SOLUTION INTRAVENOUS at 21:03

## 2023-11-27 RX ADMIN — EPHEDRINE SULFATE 10 MG: 50 INJECTION INTRAVENOUS at 21:50

## 2023-11-27 RX ADMIN — SODIUM CHLORIDE, POTASSIUM CHLORIDE, SODIUM LACTATE AND CALCIUM CHLORIDE: 600; 310; 30; 20 INJECTION, SOLUTION INTRAVENOUS at 21:55

## 2023-11-27 RX ADMIN — CEFAZOLIN 2 G: 2 INJECTION, POWDER, FOR SOLUTION INTRAMUSCULAR; INTRAVENOUS at 21:33

## 2023-11-27 RX ADMIN — SODIUM CITRATE AND CITRIC ACID MONOHYDRATE 30 ML: 500; 334 SOLUTION ORAL at 21:26

## 2023-11-27 RX ADMIN — HYDROMORPHONE HYDROCHLORIDE 100 MCG: 1 INJECTION, SOLUTION INTRAMUSCULAR; INTRAVENOUS; SUBCUTANEOUS at 21:36

## 2023-11-27 RX ADMIN — BUPIVACAINE HYDROCHLORIDE 1.6 ML: 7.5 INJECTION, SOLUTION EPIDURAL; RETROBULBAR at 21:36

## 2023-11-28 LAB
BASOPHILS # BLD AUTO: 0.02 10*3/MM3 (ref 0–0.2)
BASOPHILS NFR BLD AUTO: 0.1 % (ref 0–1.5)
DEPRECATED RDW RBC AUTO: 45.6 FL (ref 37–54)
EOSINOPHIL # BLD AUTO: 0.06 10*3/MM3 (ref 0–0.4)
EOSINOPHIL NFR BLD AUTO: 0.4 % (ref 0.3–6.2)
ERYTHROCYTE [DISTWIDTH] IN BLOOD BY AUTOMATED COUNT: 14.1 % (ref 12.3–15.4)
HCT VFR BLD AUTO: 28.3 % (ref 34–46.6)
HGB BLD-MCNC: 8.5 G/DL (ref 12–15.9)
IMM GRANULOCYTES # BLD AUTO: 0.1 10*3/MM3 (ref 0–0.05)
IMM GRANULOCYTES NFR BLD AUTO: 0.7 % (ref 0–0.5)
LYMPHOCYTES # BLD AUTO: 1.93 10*3/MM3 (ref 0.7–3.1)
LYMPHOCYTES NFR BLD AUTO: 13.9 % (ref 19.6–45.3)
MCH RBC QN AUTO: 26.5 PG (ref 26.6–33)
MCHC RBC AUTO-ENTMCNC: 30 G/DL (ref 31.5–35.7)
MCV RBC AUTO: 88.2 FL (ref 79–97)
MONOCYTES # BLD AUTO: 0.94 10*3/MM3 (ref 0.1–0.9)
MONOCYTES NFR BLD AUTO: 6.8 % (ref 5–12)
NEUTROPHILS NFR BLD AUTO: 10.86 10*3/MM3 (ref 1.7–7)
NEUTROPHILS NFR BLD AUTO: 78.1 % (ref 42.7–76)
NRBC BLD AUTO-RTO: 0 /100 WBC (ref 0–0.2)
PLATELET # BLD AUTO: 277 10*3/MM3 (ref 140–450)
PMV BLD AUTO: 10.3 FL (ref 6–12)
RBC # BLD AUTO: 3.21 10*6/MM3 (ref 3.77–5.28)
WBC NRBC COR # BLD AUTO: 13.91 10*3/MM3 (ref 3.4–10.8)

## 2023-11-28 PROCEDURE — 25010000002 KETOROLAC TROMETHAMINE PER 15 MG: Performed by: OBSTETRICS & GYNECOLOGY

## 2023-11-28 PROCEDURE — 85025 COMPLETE CBC W/AUTO DIFF WBC: CPT | Performed by: OBSTETRICS & GYNECOLOGY

## 2023-11-28 PROCEDURE — 99231 SBSQ HOSP IP/OBS SF/LOW 25: CPT | Performed by: ADVANCED PRACTICE MIDWIFE

## 2023-11-28 PROCEDURE — 25010000002 NALBUPHINE PER 10 MG: Performed by: NURSE ANESTHETIST, CERTIFIED REGISTERED

## 2023-11-28 RX ORDER — METHYLERGONOVINE MALEATE 0.2 MG/ML
200 INJECTION INTRAVENOUS AS NEEDED
Status: DISCONTINUED | OUTPATIENT
Start: 2023-11-28 | End: 2023-12-01 | Stop reason: HOSPADM

## 2023-11-28 RX ORDER — NALOXONE HCL 0.4 MG/ML
0.1 VIAL (ML) INJECTION
Status: DISCONTINUED | OUTPATIENT
Start: 2023-11-28 | End: 2023-12-01 | Stop reason: ALTCHOICE

## 2023-11-28 RX ORDER — BISACODYL 5 MG/1
10 TABLET, DELAYED RELEASE ORAL DAILY PRN
Status: DISCONTINUED | OUTPATIENT
Start: 2023-11-28 | End: 2023-12-01 | Stop reason: HOSPADM

## 2023-11-28 RX ORDER — ONDANSETRON 2 MG/ML
4 INJECTION INTRAMUSCULAR; INTRAVENOUS EVERY 6 HOURS PRN
Status: DISCONTINUED | OUTPATIENT
Start: 2023-11-28 | End: 2023-12-01 | Stop reason: HOSPADM

## 2023-11-28 RX ORDER — CARBOPROST TROMETHAMINE 250 UG/ML
250 INJECTION, SOLUTION INTRAMUSCULAR ONCE AS NEEDED
Status: DISCONTINUED | OUTPATIENT
Start: 2023-11-28 | End: 2023-12-01 | Stop reason: HOSPADM

## 2023-11-28 RX ORDER — MISOPROSTOL 200 UG/1
800 TABLET ORAL ONCE AS NEEDED
Status: DISCONTINUED | OUTPATIENT
Start: 2023-11-28 | End: 2023-12-01 | Stop reason: HOSPADM

## 2023-11-28 RX ORDER — KETOROLAC TROMETHAMINE 30 MG/ML
30 INJECTION, SOLUTION INTRAMUSCULAR; INTRAVENOUS EVERY 6 HOURS
Status: COMPLETED | OUTPATIENT
Start: 2023-11-28 | End: 2023-11-29

## 2023-11-28 RX ORDER — NALBUPHINE HYDROCHLORIDE 10 MG/ML
2.5 INJECTION, SOLUTION INTRAMUSCULAR; INTRAVENOUS; SUBCUTANEOUS EVERY 4 HOURS PRN
Status: DISPENSED | OUTPATIENT
Start: 2023-11-28 | End: 2023-11-29

## 2023-11-28 RX ORDER — HYDROMORPHONE HYDROCHLORIDE 1 MG/ML
0.5 INJECTION, SOLUTION INTRAMUSCULAR; INTRAVENOUS; SUBCUTANEOUS
Status: DISCONTINUED | OUTPATIENT
Start: 2023-11-28 | End: 2023-12-01 | Stop reason: ALTCHOICE

## 2023-11-28 RX ORDER — ACETAMINOPHEN 325 MG/1
650 TABLET ORAL EVERY 6 HOURS
Status: DISCONTINUED | OUTPATIENT
Start: 2023-11-29 | End: 2023-12-01 | Stop reason: HOSPADM

## 2023-11-28 RX ORDER — NALOXONE HCL 0.4 MG/ML
0.4 VIAL (ML) INJECTION
Status: ACTIVE | OUTPATIENT
Start: 2023-11-28 | End: 2023-11-29

## 2023-11-28 RX ORDER — DROPERIDOL 2.5 MG/ML
0.62 INJECTION, SOLUTION INTRAMUSCULAR; INTRAVENOUS ONCE AS NEEDED
Status: DISCONTINUED | OUTPATIENT
Start: 2023-11-28 | End: 2023-12-01 | Stop reason: HOSPADM

## 2023-11-28 RX ORDER — PRENATAL VIT/IRON FUM/FOLIC AC 27MG-0.8MG
1 TABLET ORAL DAILY
Status: DISCONTINUED | OUTPATIENT
Start: 2023-11-28 | End: 2023-12-01 | Stop reason: HOSPADM

## 2023-11-28 RX ORDER — OXYCODONE HYDROCHLORIDE 5 MG/1
10 TABLET ORAL EVERY 4 HOURS PRN
Status: DISCONTINUED | OUTPATIENT
Start: 2023-11-28 | End: 2023-12-01 | Stop reason: HOSPADM

## 2023-11-28 RX ORDER — HYDROXYZINE HYDROCHLORIDE 25 MG/1
50 TABLET, FILM COATED ORAL EVERY 6 HOURS PRN
Status: DISCONTINUED | OUTPATIENT
Start: 2023-11-28 | End: 2023-11-30

## 2023-11-28 RX ORDER — ACETAMINOPHEN 500 MG
1000 TABLET ORAL EVERY 6 HOURS
Status: COMPLETED | OUTPATIENT
Start: 2023-11-28 | End: 2023-11-28

## 2023-11-28 RX ORDER — SIMETHICONE 80 MG
80 TABLET,CHEWABLE ORAL 4 TIMES DAILY PRN
Status: DISCONTINUED | OUTPATIENT
Start: 2023-11-28 | End: 2023-12-01 | Stop reason: HOSPADM

## 2023-11-28 RX ORDER — OXYCODONE HYDROCHLORIDE 5 MG/1
5 TABLET ORAL EVERY 4 HOURS PRN
Status: DISCONTINUED | OUTPATIENT
Start: 2023-11-28 | End: 2023-12-01 | Stop reason: HOSPADM

## 2023-11-28 RX ORDER — ONDANSETRON 4 MG/1
4 TABLET, FILM COATED ORAL EVERY 8 HOURS PRN
Status: DISCONTINUED | OUTPATIENT
Start: 2023-11-28 | End: 2023-12-01 | Stop reason: HOSPADM

## 2023-11-28 RX ORDER — DOCUSATE SODIUM 100 MG/1
100 CAPSULE, LIQUID FILLED ORAL 2 TIMES DAILY PRN
Status: DISCONTINUED | OUTPATIENT
Start: 2023-11-28 | End: 2023-12-01 | Stop reason: HOSPADM

## 2023-11-28 RX ORDER — OXYTOCIN/0.9 % SODIUM CHLORIDE 30/500 ML
125 PLASTIC BAG, INJECTION (ML) INTRAVENOUS CONTINUOUS PRN
Status: DISCONTINUED | OUTPATIENT
Start: 2023-11-28 | End: 2023-12-01 | Stop reason: HOSPADM

## 2023-11-28 RX ORDER — IBUPROFEN 600 MG/1
600 TABLET ORAL EVERY 6 HOURS
Status: DISCONTINUED | OUTPATIENT
Start: 2023-11-29 | End: 2023-12-01 | Stop reason: HOSPADM

## 2023-11-28 RX ORDER — BISACODYL 10 MG
10 SUPPOSITORY, RECTAL RECTAL DAILY PRN
Status: DISCONTINUED | OUTPATIENT
Start: 2023-11-28 | End: 2023-12-01 | Stop reason: HOSPADM

## 2023-11-28 RX ADMIN — HYDROXYZINE HYDROCHLORIDE 50 MG: 25 TABLET ORAL at 08:41

## 2023-11-28 RX ADMIN — ACETAMINOPHEN 1000 MG: 500 TABLET, FILM COATED ORAL at 16:00

## 2023-11-28 RX ADMIN — PRENATAL VIT W/ FE FUMARATE-FA TAB 27-0.8 MG 1 TABLET: 27-0.8 TAB at 08:41

## 2023-11-28 RX ADMIN — OXYCODONE HYDROCHLORIDE 10 MG: 5 TABLET ORAL at 07:24

## 2023-11-28 RX ADMIN — NALBUPHINE HYDROCHLORIDE 2.5 MG: 10 INJECTION, SOLUTION INTRAMUSCULAR; INTRAVENOUS; SUBCUTANEOUS at 01:17

## 2023-11-28 RX ADMIN — ACETAMINOPHEN 1000 MG: 500 TABLET, FILM COATED ORAL at 21:29

## 2023-11-28 RX ADMIN — KETOROLAC TROMETHAMINE 30 MG: 30 INJECTION, SOLUTION INTRAMUSCULAR; INTRAVENOUS at 06:01

## 2023-11-28 RX ADMIN — KETOROLAC TROMETHAMINE 30 MG: 30 INJECTION, SOLUTION INTRAMUSCULAR; INTRAVENOUS at 00:24

## 2023-11-28 RX ADMIN — ACETAMINOPHEN 1000 MG: 500 TABLET, FILM COATED ORAL at 03:06

## 2023-11-28 RX ADMIN — KETOROLAC TROMETHAMINE 30 MG: 30 INJECTION, SOLUTION INTRAMUSCULAR; INTRAVENOUS at 11:40

## 2023-11-28 RX ADMIN — KETOROLAC TROMETHAMINE 30 MG: 30 INJECTION, SOLUTION INTRAMUSCULAR; INTRAVENOUS at 19:00

## 2023-11-28 RX ADMIN — HYDROXYZINE HYDROCHLORIDE 50 MG: 25 TABLET ORAL at 01:06

## 2023-11-28 RX ADMIN — ACETAMINOPHEN 1000 MG: 500 TABLET, FILM COATED ORAL at 08:41

## 2023-11-28 NOTE — LACTATION NOTE
Mother's Name: Alie  Phone #: [phone]  Infant Name: Lars  : 23  Gestation: 39w0d  Day of life:   Birth weight:  7-13.2 (3550 g) Discharge weight:  Weight Loss:   24 hour Summary of Feeds: 3 BF + Formula X 1 (20 ml)  Voids: 2 Stools: 1  Assistive devices (shields, shells, etc): None  Significant Maternal history: , Fibromyalgia, Former Smoker   Maternal Concerns: None verbalized  Maternal Goal: Breastfeed to provide colostrum. Supplement as needed. Unsure of long term plan. Attempted to BF first child who is now 14 years old. Had difficulty latching. Pumped for 6 weeks.   Mother's Medications: PNV, Protonix   Breastpump for home: Yes  Ped follow up appt:     RN requested lactation assistance at this time. States patient is having latching difficulty and will not call for help. Visit in room to offer assistance. Patient agreeable. States infant gets mad when forced to latch. States she will latch but quickly pushes away. Observed patient attempting to latch holding breast with fingers close to nipple. Observed patient easily hand express large drops of colostrum. With permission, demonstrated shaping breast and deep latching. Infant latched without difficulty. Consistent sucking noted with audible swallows. Infant did not push away during visit. Discussed the need to latch deeply to stimulate suck. Prompted breast compressions to aid with milk transfer. Encouragement and support provided. Offered assistance with feedings today. Appreciation verbalized. Questions denied.     1615  Patient requested lactation assistance. Patient walking around room trying to latch infant. She reports infant is fussy and she continues to push away after latching and sucking a few times. Recommended patient sit down and try to get as comfortable as possible. Observed patient tugging on nipple when trying to latch infant. Infant latched but latch appeared shallow. Patient had difficulty keeping infant close to  maintain latch. After assisting with deep latching, placed pillows to provide support and assist with keeping infant close to breast. Consistent deep jaw drops with multiple audible swallows heard. Explained how well infant does once positioned and latched appropriately. Recommended hand expressing between feeds and pumping if patient supplements with formula to protect supply. Offered to set pump up in room. Patient declined. States she brought her pump. Encouragement and support provided. No further assistance needed at this time.    Instructed patient our lactation team is here for continued support throughout their breastfeeding journey. Our team has encouraged patient to call with any questions or concerns that may arise after discharge.

## 2023-11-28 NOTE — ANESTHESIA POSTPROCEDURE EVALUATION
"Patient: Alie Cleary    Procedure Summary       Date: 23 Room / Location: [facility] LABOR DELIVERY 2 /  [facility] LABOR DELIVERY    Anesthesia Start:  Anesthesia Stop:     Procedure:  SECTION REPEAT WITH TUBAL (Bilateral: Abdomen) Diagnosis:       Previous  delivery, antepartum      Double nuchal cord      Anhydramnios      (Previous  delivery, antepartum [O34.219])    Surgeons: Corky Rios MD Provider:     Anesthesia Type: ITN, spinal ASA Status: 2 - Emergent            Anesthesia Type: ITN, spinal    Vitals  No vitals data found for the desired time range.          Post Anesthesia Care and Evaluation    Patient location during evaluation: PACU  Patient participation: complete - patient participated  Level of consciousness: awake and alert  Pain management: adequate    Airway patency: patent  Anesthetic complications: No anesthetic complications    Cardiovascular status: acceptable  Respiratory status: acceptable  Hydration status: acceptable    Comments: Blood pressure 107/90, pulse 102, temperature 97.8 °F (36.6 °C), temperature source Oral, resp. rate 18, height 172.7 cm (68\"), weight 88.5 kg (195 lb), currently breastfeeding.    Pt discharged from PACU based on jose miguel score >8    "

## 2023-11-28 NOTE — OP NOTE
SECTION REPEAT WITH TUBAL  Procedure Report    Patient Name:  Alie Cleary  YOB: 1990    Date of Surgery:  2023     Pre-op Diagnosis:   39 weeks 0 days gestation  Labor  H/o  delivery, desires repeat with tubal  Late transfer of prenatal care    Post-op Diagnosis:  39 weeks 0 days gestation  Labor  H/o  delivery, desires repeat with tubal  Late transfer of prenatal care  Double nuchal cord   Anhydramnios  S/p repeat low transverse  section with bilateral tubal ligation    Procedure:  Procedure(s):   SECTION REPEAT WITH TUBAL    Staff:  Surgeon(s):  Cali Enrique MD    Anesthesia: spinal    Antibiotics: cefazolin 2 gms    Drains: Bassett catheter    Quantitative Blood Loss: 857 mL    Urinary Output: 300 mL     Fluids: 400 mL    Implants:    Nothing was implanted during the procedure    Specimen:          Specimens       ID Source Type Tests Collected By Collected At Frozen?    A Placenta Tissue TISSUE PATHOLOGY EXAM   Cali Enrique MD 23 No    Comment: Gestational Age: @GA@                   @GP@    APGAR:  1 Minute: 8  5 Minute: 9    Placenta  Delivered: 2023  9:57 PM  Appearance: Intact  Removal: Spontaneous    Disposition: lab   pathology     Abnormalities to Placenta:   Normal placenta and co rd    Fluid Appearance:       Delivery Complications: None     Maternal Medical History Current Pregnancy:  N/A    Is Patient Diabetic:  No    Infections During Pregnancy:  N/A                 This specimen was not marked as sent.    B Fallopian Tubes, Bilateral Tissue TISSUE PATHOLOGY EXAM   Cali Enrique MD 23     This specimen was not marked as sent.    C Fallopian Tubes, Bilateral Tissue TISSUE PATHOLOGY EXAM   Cali Enrique MD 23     This specimen was not marked as sent.              Delivery     Delivery: , Low Transverse     YOB: 2023    Time of Birth:  Gestational Age 9:54 PM  "  39w0d     Anesthesia:      Delivering clinician: Cali Enrique      Infant    Findings: female  infant     Infant observations: Weight: 3550 g (7 lb 13.2 oz)   Length: 19  in  Observations/Comments:  AGA      Apgars: 8  @ 1 minute /    9  @ 5 minutes         Placenta, Cord, and Fluid    Placenta delivered  Spontaneous  at   11/27/2023  9:57 PM     Cord: 3 vessels  present.   Nuchal Cord?  yes; Number of nuchal loops present:  2    Cord blood obtained: Yes    Cord gases obtained:  Yes    Cord gas results: Venous:  No results found for: \"PHCVEN\", \"BECVEN\"    Arterial:  No results found for: \"PHCART\", \"BECART\"     Operative Findings:  Normal appearing uterus, ovaries, and fallopian tubes bilaterally.    Complications  none    Description of Procedure:   After informed consent was obtained, the patient was taken to the operating room where  Spinal anesthesia was administered. A time out procedure was completed, confirming the correct patient and correct procedure. Perioperative antibiotics were administered. She was placed in the supine position with leftward tilt and her abdomen was prepped and draped in normal sterile fashion after a Bassett catheter was placed by nursing staff.     After confirming adequate anesthesia, a Pfannenstiel incision was made in the skin with the surgical scalpel. Sharp dissection was carried out over the subsequent layers of tissue down to the fascia at midline. The fascial incision was extended laterally in both directions with fascial stretch. The rectus muscles were divided at midline and the peritoneum was then opened with blunt dissection at its upper margin. The peritoneum was then stretched by pulling caudally and cranially. All layers of the abdominal wall were manually stretched laterally to provide an opening as large as the skin incision.  An Caleb self-retaining retractor was then inserted, taking care to avoid entrapping the bowel.     A bladder flap was not created.  A " low-transverse incision was made in the lower uterine segment using the scalpel. The uterine incision was extended bilaterally using blunt dissection in a cranial-caudad stretch. The amniotic sac was entered and no amniotic fluid was noted.    The surgeon’s hand was placed into the uterine cavity. The fetal head was identified, elevated into the abdomen and delivered through the uterine incision with the assistance of fundal pressure. The infant was examined for nuchal cord. A fairly loose nuchal cord was identified and reduced without excessive tension..    The infant was delivered with traction and the assistance of fundal pressure. Upon delivery, terminal meconium appreciated. The infant’s oral and nasal passages were bulb suctioned. The infant was vigorous on the field. Delayed cord clamping for 30 seconds was observed. Following delayed cord clamping, the cord was clamped and cut x2. The infant was then passed off the table to the awaiting infant care nurse for further care. Cord blood & gases were obtained for analysis and routine blood testing and the placenta was expressed.    Oxytocin was administered by IV infusion to enhance uterine contraction. The uterus was cleared of all clots and remaining products of conception, and then repaired in situ. The uterine incision was closed with #0-Vicryl in a running, locked fashion. A second imbricating stitch with a #0-Vicryl was used to obtain excellent hemostasis. Non-hemostatic areas were reinforced using #2-0-Vicryl in figure-of-eight stitches. Good hemostasis was confirmed.     The RIGHT fallopian tube was then grasped with a Lorraine clamp and an opening created in the underlying mesosalpinx using the Bovie.  A 3 cm length of the fallopian tube was isolated from the adjacent adnexa using 0 plain ties.  Resulting pedicles were cauterized with the bovie.  Hemostasis was achieved without any further measures.  The LEFT fallopian tube was then addressed in a similar  fashion.  Both ovaries were noted to be normal.    The pericolic gutters were cleared of all clots.  The peritoneum followed by the rectus muscles were reapproximated with 2-O Vicryl.  The fascia was reapproximated using #0-Vicryl in a running non-locked fashion. The subcutaneous layer was <2 cm. The skin was reapproximated using #4-O Monocryl. The incision was reinforced using Skin glue. Cytotec 800 mcg was placed rectally following the procedure.     All sponge, needle, and instrument counts were noted to be correct ×3 at the end of the procedure.     Disposition  Mother to Mother Baby/Postpartum  in stable condition currently.  Baby to remains with mom  in stable condition currently.    Cali Enrique MD     Date: 11/27/2023  Time: 22:29 CST

## 2023-11-28 NOTE — PAYOR COMM NOTE
"REF:[id number]    [facility]  [place],  [phone]  OR  FAX   [phone]    Alie Cleary (33 y.o. Female)       Date of Birth   1990    Social Security Number       Address   [address] [place] [address]    Home Phone   [phone]    MRN   [id number]       [place]   Other    Marital Status   Single                            Admission Date   23    Admission Type   Elective    Admitting Provider   Corky Rios MD    Attending Provider   Cali Enrique MD    Department, Room/Bed   [facility] MOTHER BABY 2A, M237/1       Discharge Date       Discharge Disposition       Discharge Destination                                 Attending Provider: Cali Enrique MD    Allergies: No Known Allergies    Isolation: None   Infection: None   Code Status: CPR    Ht: 172.7 cm (67.99\")   Wt: 88.5 kg (195 lb)    Admission Cmt: None   Principal Problem: Normal labor [O80,Z37.9]                   Active Insurance as of 2023       Primary Coverage       Payor Plan Insurance Group Employer/Plan Group    ANTHEM MEDICAID AN[place]M MEDICAID [id number]       Payor Plan Address Payor Plan Phone Number Payor Plan Fax Number Effective Dates    [address] [address] [phone]  2023 - None Entered    [facility] [address]         Subscriber Name Subscriber Birth Date Member ID       ALIE CLEARY 1990 [id number]                     Emergency Contacts        (Rel.) Home Phone Work Phone Mobile Phone    Lorna Burgos (Mother) -- -- [phone]    EDWARD VÁZQUEZ (Friend) [phone] -- --         EDC 2023    39 WKS GESTATIONAL AGE     G-4 P-2       Alie ClearysGchavo [[id number]]    Labor Events     labor?: No   steroids?: None  Antibiotics during labor?: No  Rupture date/time: 2023 1828  Rupture type: Intact, leaking  Fluid color: clear  Labor type: Spontaneous Onset of Labor  Labor allowed to proceed with plans for an " "attempted vaginal birth?: No  Complications: None  Presentation    Presentation: Vertex  Position: Occiput  [place] Delivery    Head delivery date/time: 2023 21:54:15  Delivery date/time: 2023  9:54 PM   Delivery type: , Low Transverse   Details: Trial of labor?: No    categorization: repeat    priority: routine   Indications for : Prior C/S   Skin Incision Type: Pfannensteil        Operative Delivery    Forceps attempted?: No  Vacuum extractor attempted?: No                                                   Assessment    Living status: Living  Infant family band number: [id number]  Apgars   1 Minute: 5 Minute: 10 Minute 15 Minute 20 Minute   Skin Color: 0 1      Heart Rate: 2 2      Reflex Irritability: 2 2      Muscle Tone: 2 2      Respiratory Effort: 2 2      Total: 8 9              Apgars Assigned By: ALFA CASTILLO  Resuscitation    Method: Suctioning, Tactile Stimulation, Warmed via Radiant Warmer , Dried  Suction Details  Suction method: catheter  Airway suction: mouth, pharyngeal  Oxygen Details  Measurements    Weight: 7 lb 13.2 oz 3550 g Length: 19\"       Birth comments: AGA  Delivery Information    Episiotomy: None  Perineal lacerations: None    Surgical or additional est. blood loss (mL): 857  Combined est. blood loss (mL): 857     History & Physical        Cali Enrique MD at 23 2104          H&P updated. The patient was examined and is noted to be in early labor. Contractions since this morning. Have continued every 2-5 minutes. She represented this evening with continued contractions. Breathing through them. Contractions every 2-5 minutes. Has now made cervical change since being here. Initially closed. Now /-3. NST with an occasional variables but overall reactive and reassuring. She is to stay for repeat c/s. She does desire tubal.  Risks, benefits, and alternatives of permanent sterilization were discussed with the " patient in detail including feelings of regret. Intraoperative risks of bleeding and damage to surrounding organs, including but not limited to intestine, bladder and ureter, were explained. Management of these were also explained. Postoperative complications such as infection, pneumonia, DVT, and bleeding were explained. The importance of compliance with postoperative restrictions was discussed. Laparoscopic options were discussed. Success and failure rates were discussed. Increased risk of ectopic pregnancy was explained. In addition, reversible forms of contraception were reviewed such as the pill, the patch/ring, the shot, the implant, and the IUD.     Specifically, bilateral salpingectomy was discussed.  Reduction in fallopian tube cancer was discussed as well as potential decrease in ovarian cancer risk discussed.  Irreversible nature of this approach as well as the need for at least one additional laparoscopic incision was discussed. Discussed that if she does desire future fertility, that Assisted [employer] (ie IVF), would be needed, of which is of significant financial burden. She expressed understanding of the above.     All of the patient's questions were answered to her satisfaction. She was encouraged to return for an additional appointment if she had further questions. She verbalized understanding of the above and wished to proceed with the outlined plan - repeat c/s with bilateral tubal ligation.      Electronically signed by Cali Enrique MD at 237   Source Note          [facility]  [facility]  : 1990  MRN: [id number]  CSN: [id number]    History and Physical    Subjective  Alie Cleary is a 33 y.o. year old  with an Estimated Date of Delivery: 23 scheduled on  for  delivery due to previous  section declines .  She is planning for sterilization at the time of the .    Prenatal care has been with Dr. Moulton  [place].  It has been benign.    OB History    Para Term  AB Living   4 1 1 0 2 1   SAB IAB Ectopic Molar Multiple Live Births   2 0 0 0 0 1      # Outcome Date GA Lbr Yordy/2nd Weight Sex Delivery Anes PTL Lv   4 Current            3 SAB 2017 3w0d          2 Term 09 42w0d   M CS-Unspec EPI N JOEY      Complications: Fetal Intolerance   1 SAB 08 8w0d      N      Past Medical History:   Diagnosis Date    Fibromyalgia      Past Surgical History:   Procedure Laterality Date     SECTION      MOUTH SURGERY         Current Outpatient Medications:     pantoprazole (Protonix) 40 MG EC tablet, Take 1 tablet by mouth Daily., Disp: 30 tablet, Rfl: 1    prenatal vitamin (prenatal, CLASSIC, vitamin) tablet, Take  by mouth Daily., Disp: , Rfl:   No family history on file.    No Known Allergies  Social History    Tobacco Use      Smoking status: Former        Packs/day: 0.25        Years: 16.00        Additional pack years: 0.00        Total pack years: 4.00        Types: Cigarettes        Quit date: 2023        Years since quittin.7      Smokeless tobacco: Never    Review of Systems      Objective  /78   Wt 86.2 kg (190 lb)   BMI 28.89 kg/m²   General: well developed; well nourished  no acute distress   Heart: regular rate and rhythm   Lungs: breathing is unlabored   Abdomen: soft, non-tender; no masses     Cervix:   presenting part vertex  Prenatal Labs  Lab Results   Component Value Date    HGB 11.1 2023       Recent Labs  Lab Results   Component Value Date    HGB 11.1 2023    HCT 40.3 2020    WBC 13.2 (H) 2020     2020           Assessment  IUP with an Estimated Date of Delivery: 23  Planned  section on  for previous  section declines .  Undesired fertility     Plan  Repeat  with bilateral tubal ligation      Risks, benefits, and alternatives to  section were discussed with the patient at   length.  The surgical nature of  section was discussed.  The indications for  section were discussed.  Risks of bleeding, infection, and damage to surrounding organs were reviewed.  Injury to blood vessels, the urinary bladder, the ureter, and the intestines were all reviewed.  Management of these complications were reviewed.    Risks, benefits, and alternatives of permanent sterilization were discussed with the patient in detail. Intraoperative risks of bleeding and damage to surrounding organs, including but not limited to intestine, bladder and ureter, were explained. Management of these were also explained. Postoperative complications such as infection, pneumonia, DVT, and bleeding were explained. The importance of compliance with postoperative restrictions was discussed.  Success and failure rates were discussed. Increased risk of ectopic pregnancy was explained. In addition, reversible forms of contraception were reviewed such as the pill, the patch, the shot, and the IUD.     All of the patient's questions were answered to her satisfaction. She was encouraged to return for an additional appointment if she had further questions. She verbalized understanding of the above and wished to proceed with the outlined plan.      Corky Rios MD  2023           Electronically signed by Corky Rios MD at 23 1049                 Corky Rios MD at 23 1030          [facility]  Alie Cleary  : 1990  MRN: [id number]  CSN: [id number]    History and Physical    Subjective  Alie Cleary is a 33 y.o. year old  with an Estimated Date of Delivery: 23 scheduled on  for  delivery due to previous  section declines .  She is planning for sterilization at the time of the .    Prenatal care has been with Dr. Kenney Rios.  It has been benign.    OB History    Para Term  AB Living   4 1 1 0 2 1   SAB IAB  Ectopic Molar Multiple Live Births   2 0 0 0 0 1      # Outcome Date GA Lbr Yordy/2nd Weight Sex Delivery Anes PTL Lv   4 Current            3 SAB 2017 3w0d          2 Term 09 42w0d   M CS-Unspec EPI N JOEY      Complications: Fetal Intolerance   1 SAB 08 8w0d      N      Past Medical History:   Diagnosis Date    Fibromyalgia      Past Surgical History:   Procedure Laterality Date     SECTION      MOUTH SURGERY         Current Outpatient Medications:     pantoprazole (Protonix) 40 MG EC tablet, Take 1 tablet by mouth Daily., Disp: 30 tablet, Rfl: 1    prenatal vitamin (prenatal, CLASSIC, vitamin) tablet, Take  by mouth Daily., Disp: , Rfl:   No family history on file.    No Known Allergies  Social History    Tobacco Use      Smoking status: Former        Packs/day: 0.25        Years: 16.00        Additional pack years: 0.00        Total pack years: 4.00        Types: Cigarettes        Quit date: 2023        Years since quittin.7      Smokeless tobacco: Never    Review of Systems      Objective  /78   Wt 86.2 kg (190 lb)   BMI 28.89 kg/m²   General: well developed; well nourished  no acute distress   Heart: regular rate and rhythm   Lungs: breathing is unlabored   Abdomen: soft, non-tender; no masses     Cervix:   presenting part vertex  Prenatal Labs  Lab Results   Component Value Date    HGB 11.1 2023       Recent Labs  Lab Results   Component Value Date    HGB 11.1 2023    HCT 40.3 2020    WBC 13.2 (H) 2020     2020           Assessment  IUP with an Estimated Date of Delivery: 23  Planned  section on  for previous  section declines .  Undesired fertility     Plan  Repeat  with bilateral tubal ligation      Risks, benefits, and alternatives to  section were discussed with the patient at  length.  The surgical nature of  section was discussed.  The indications for  section were  discussed.  Risks of bleeding, infection, and damage to surrounding organs were reviewed.  Injury to blood vessels, the urinary bladder, the ureter, and the intestines were all reviewed.  Management of these complications were reviewed.    Risks, benefits, and alternatives of permanent sterilization were discussed with the patient in detail. Intraoperative risks of bleeding and damage to surrounding organs, including but not limited to intestine, bladder and ureter, were explained. Management of these were also explained. Postoperative complications such as infection, pneumonia, DVT, and bleeding were explained. The importance of compliance with postoperative restrictions was discussed.  Success and failure rates were discussed. Increased risk of ectopic pregnancy was explained. In addition, reversible forms of contraception were reviewed such as the pill, the patch, the shot, and the IUD.     All of the patient's questions were answered to her satisfaction. She was encouraged to return for an additional appointment if she had further questions. She verbalized understanding of the above and wished to proceed with the outlined plan.      Corky Rios MD  2023           Electronically signed by Corky Rios MD at 23 1049          Operative/Procedure Notes (last 48 hours)        Cali Enrique MD at 23 2143           SECTION REPEAT WITH TUBAL  Procedure Report    Patient Name:  Alie Cleary  YOB: 1990    Date of Surgery:  2023     Pre-op Diagnosis:   39 weeks 0 days gestation  Labor  H/o  delivery, desires repeat with tubal  Late transfer of prenatal care    Post-op Diagnosis:  39 weeks 0 days gestation  Labor  H/o  delivery, desires repeat with tubal  Late transfer of prenatal care  Double nuchal cord   Anhydramnios  S/p repeat low transverse  section with bilateral tubal ligation    Procedure:  Procedure(s):   SECTION  "REPEAT WITH TUBAL    Staff:  Surgeon(s):  Cali Enrique MD    Anesthesia: spinal    Antibiotics: cefazolin 2 gms    Drains: Bassett catheter    Quantitative Blood Loss: 857 mL    Urinary Output: 300 mL     Fluids: 400 mL    Implants:    Nothing was implanted during the procedure    Specimen:          Specimens       ID Source Type Tests Collected By Collected At Frozen?    A Placenta Tissue TISSUE PATHOLOGY EXAM   Cali Enrique MD 23 No    Comment: Gestational Age: @GA@                   @GP@    APGAR:  1 Minute: 8  5 Minute: 9    Placenta  Delivered: 2023  9:57 PM  Appearance: Intact  Removal: Spontaneous    Disposition: lab   pathology     Abnormalities to Placenta:   Normal placenta and co rd    Fluid Appearance:       Delivery Complications: None     Maternal Medical History Current Pregnancy:  N/A    Is Patient Diabetic:  No    Infections During Pregnancy:  N/A                 This specimen was not marked as sent.    B Fallopian Tubes, Bilateral Tissue TISSUE PATHOLOGY EXAM   Cali Enrique MD 23     This specimen was not marked as sent.    C Fallopian Tubes, Bilateral Tissue TISSUE PATHOLOGY EXAM   Cali Enrique MD 23     This specimen was not marked as sent.              Delivery     Delivery: , Low Transverse     YOB: 2023    Time of Birth:  Gestational Age 9:54 PM   39w0d     Anesthesia:      Delivering clinician: Cali Enrique      Infant    Findings: female  infant     Infant observations: Weight: 3550 g (7 lb 13.2 oz)   Length: 19  in  Observations/Comments:  AGA      Apgars: 8  @ 1 minute /    9  @ 5 minutes         Placenta, Cord, and Fluid    Placenta delivered  Spontaneous  at   2023  9:57 PM     Cord: 3 vessels  present.   Nuchal Cord?  yes; Number of nuchal loops present:  2    Cord blood obtained: Yes    Cord gases obtained:  Yes    Cord gas results: Venous:  No results found for: \"PHCVEN\", \"BECVEN\"    Arterial:  No " "results found for: \"PHCART\", \"BECART\"     Operative Findings:  Normal appearing uterus, ovaries, and fallopian tubes bilaterally.    Complications  none    Description of Procedure:   After informed consent was obtained, the patient was taken to the operating room where  Spinal anesthesia was administered. A time out procedure was completed, confirming the correct patient and correct procedure. Perioperative antibiotics were administered. She was placed in the supine position with leftward tilt and her abdomen was prepped and draped in normal sterile fashion after a Bassett catheter was placed by nursing staff.     After confirming adequate anesthesia, a Pfannenstiel incision was made in the skin with the surgical scalpel. Sharp dissection was carried out over the subsequent layers of tissue down to the fascia at midline. The fascial incision was extended laterally in both directions with fascial stretch. The rectus muscles were divided at midline and the peritoneum was then opened with blunt dissection at its upper margin. The peritoneum was then stretched by pulling caudally and cranially. All layers of the abdominal wall were manually stretched laterally to provide an opening as large as the skin incision.  An Caleb self-retaining retractor was then inserted, taking care to avoid entrapping the bowel.     A bladder flap was not created.  A low-transverse incision was made in the lower uterine segment using the scalpel. The uterine incision was extended bilaterally using blunt dissection in a cranial-caudad stretch. The amniotic sac was entered and no amniotic fluid was noted.    The surgeon’s hand was placed into the uterine cavity. The fetal head was identified, elevated into the abdomen and delivered through the uterine incision with the assistance of fundal pressure. The infant was examined for nuchal cord. A fairly loose nuchal cord was identified and reduced without excessive tension..    The infant was " delivered with traction and the assistance of fundal pressure. Upon delivery, terminal meconium appreciated. The infant’s oral and nasal passages were bulb suctioned. The infant was vigorous on the field. Delayed cord clamping for 30 seconds was observed. Following delayed cord clamping, the cord was clamped and cut x2. The infant was then passed off the table to the awaiting infant care nurse for further care. Cord blood & gases were obtained for analysis and routine blood testing and the placenta was expressed.    Oxytocin was administered by IV infusion to enhance uterine contraction. The uterus was cleared of all clots and remaining products of conception, and then repaired in situ. The uterine incision was closed with #0-Vicryl in a running, locked fashion. A second imbricating stitch with a #0-Vicryl was used to obtain excellent hemostasis. Non-hemostatic areas were reinforced using #2-0-Vicryl in figure-of-eight stitches. Good hemostasis was confirmed.     The RIGHT fallopian tube was then grasped with a [place] clamp and an opening created in the underlying mesosalpinx using the Bovie.  A 3 cm length of the fallopian tube was isolated from the adjacent adnexa using 0 plain ties.  Resulting pedicles were cauterized with the bovie.  Hemostasis was achieved without any further measures.  The LEFT fallopian tube was then addressed in a similar fashion.  Both ovaries were noted to be normal.    The pericolic gutters were cleared of all clots.  The peritoneum followed by the rectus muscles were reapproximated with 2-O Vicryl.  The fascia was reapproximated using #0-Vicryl in a running non-locked fashion. The subcutaneous layer was <2 cm. The skin was reapproximated using #4-O Monocryl. The incision was reinforced using Skin glue. Cytotec 800 mcg was placed rectally following the procedure.     All sponge, needle, and instrument counts were noted to be correct ×3 at the end of the procedure.     Disposition  Mother  to Mother Baby/Postpartum  in stable condition currently.  Baby to remains with mom  in stable condition currently.    Cali Enrique MD     Date: 11/27/2023  Time: 22:29 CST        Electronically signed by Cali Enrique MD at 11/27/23 5715        Universal Safety Interventions

## 2023-11-28 NOTE — PLAN OF CARE
Goal Outcome Evaluation:       39+0. Delivery of female infant via  delivery due to spontaneous onset of labor. Fundus firm, at umbilicus, scant to light bleeding. Second bag of pitocin infusing. Patient has no complaints of pain and is sleeping between care.

## 2023-11-28 NOTE — ANESTHESIA PREPROCEDURE EVALUATION
Anesthesia Evaluation     Patient summary reviewed and Nursing notes reviewed   NPO Solid Status: Waived due to emergency  NPO Liquid Status: Waived due to emergency           Airway   Mallampati: I  TM distance: >3 FB  Neck ROM: full  No difficulty expected  Dental - normal exam     Pulmonary - normal exam   (+) a smoker Former,  Cardiovascular - negative cardio ROS and normal exam  Exercise tolerance: good (4-7 METS)        Neuro/Psych- negative ROS  GI/Hepatic/Renal/Endo - negative ROS     Musculoskeletal     (+) arthralgias      ROS comment: fibromyalgia  Abdominal  - normal exam   Substance History - negative use     OB/GYN    (+) Pregnant        Other   arthritis,                 Anesthesia Plan    ASA 2 - emergent     ITN and spinal       Anesthetic plan, risks, benefits, and alternatives have been provided, discussed and informed consent has been obtained with: patient and spouse/significant other.    CODE STATUS:    Code Status (Patient has no pulse and is not breathing): CPR (Attempt to Resuscitate)  Medical Interventions (Patient has pulse or is breathing): Full Support

## 2023-11-28 NOTE — LACTATION NOTE
Mother's Name:  Alie  Phone #: [phone]  Infant Name: Lars  :  23  Gestation:  39w 0d  Day of life:  Birth weight:  7-13.2 (3550 g)  Discharge weight:  Weight Loss:   24 hour Summary of Feeds:  Voids:  Stools:  Assistive devices (shields, shells, etc):  Significant Maternal history:  G4, P2, Attempted to BF first baby, unable to latch, pumped for 6 weeks, (15 y/o)  Hx of Fibromyalgia and smoking.  Maternal Concerns: Having enough milk  Maternal Goal:  Breastfeed colostrum and unsure after that  Mother's Medications:  PNV, Protonix - Pt took Lyrica prior to pregnancy for Fibro   Breastpump for home:  Yes, new through ins  Ped follow up appt:     Patient latched infant in recovery after C/S, minimally.  Stopped because of intense itching and requested formula.  Attempted to BF after admission to PP.  Nursing assisted with initial latch.  I arrived about 4 minutes afterwards and mom had bottle feeding infant.  She related infant came off breast and was fussy because she was hungry so she gave her bottle.  I assist mom with putting infant on left breast in cradle position.  With mom's permission, demonstrated hand expression.  Large drops of colostrum present.  Infant latched immediately after rooting around.  Infant sucking with deep jaw drops, but would pull her head back and pull off breast.  Able to latch back on breast immediately.  Switched to football hold on right breast and assisted mom with this position. Infant latched and sucked with deep jaw drops for about 10 min. Advised parents that supplementation was not recommended after this feeding.  Infant had 9 ml formula prior to me assisting with latching.  Gave and explained breastfeeding book and handouts.  Discussed benefits of skin to skin, avoiding supplementing as long as mom is breastfeeding on demand.  Encouraged breast compression and massage to help assist transfer of colostrum.      Breastfeeding and Diaper Chart  Check List for  Essentials of Positioning And Latch-on handout provided by [employer]  Hand Expression handout provided by [employer]  Five Keys to Successful Breastfeeding handout provided by [employer]    The Many Benefits if Breastfeeding handout given  Breastfeeding saves time  *Breastfeeding allows you to calm or feed your baby immediately, which leads to a happier baby who cries less  *There is nothing to buy, prepare, or maintain.There is nothing to clean or sterilize.  Breastfeeding builds a mothers confidence  *She knows all her baby needs to thrive is her!  Breastfeeding saves Money  *There is no formula to buy and healthier breast fed babies have less medical costs  Healthy Mom/Healthy baby  * babies get sick less often, and when they do they are usually sick less severely and for a shorter time  * babies have fewer ear infections  * babies have fewer allergies  *Mothers who breastfeed have a lower risk for cancer, osteoporosis, anemia, high blood pressure, obesity, and Type ll diabetes  *Mothers miss less work days with sick babies  Breast fed babies have a better dental health  * babies have better jaw development which requires lest orthodontic work  *Breast milk does not promote cavities  * babies can nurse at night without worry of tooth decay  Breastfeeding allows a baby to reach his full IQ potential  *The longer a baby is breast fed, the better their brain development  Breast fed babies and moms are more relaxed  *The hormones released during breastfeeding have a calming effect on mothers  *Breastfeeding requires mom to take a break; this may help mom get more rest after delivery  *Breastfeeding is quicker than preparing formula which allows mom and baby to get back to sleep faster  *Breastfeeding promotes bonding and allows mom to learn babies cues and care needs more quickly  Breastfeeding cleanup is  "easier  *The bowel movements and spit up of breast fed babies doesn't smell as bad  *Spit-up of breast fed babies doesn't stain clothing  Getting out of the hourse is easier  *No formula bottles to prepare and carry safely   *No time restraints due to worry about what baby will eat  *No worries about warming a bottle or finding safe water to prepare bottles  Breastfeeding mother get their bodies back sooner  *The uterus shrinks more quickly and completely, which allows a flatter tummy  *Breastfeeding burns 400-500 calories a day; making milk torches stored fat!  Breastfeeding is better for the environment  *There is no trash to dispose of after breastfeeding  *There is no production facility to produce breast milk; moms body does it all without the pollution of a factory      Your Guide to Breastfeeding Booklet by [employer], www.[employer]      Safe Storage of Breastmilk magnet: Diary.[employer]    1159  Asked to assist with latching by nursing.  Mom has infant sitting in her lap when I arrived.  She had attempted to latch, but said infant was \"fighting\" while at the breast, pushing away, and then got fussy and refused to latch.  Attempted to place infant in cradle position on left breast.  Infant latched and sucked for a couple minutes then pushed with hands and released breast.  Placed infant in ventral position lying directly on breast.   Infant began rooting and with minimal assistance latched independently and remained at breast for 15 minutes with some stimulation.  After burping, placed infant in ventral position on right breast and remained latched for 15 minutes with one relatch.  Mom able to hand express large drops of colostrum while latching infant.    Instructed mom our lactation team is here for continued support throughout their breastfeeding journey. Our team has encouraged mom to call with any questions or concerns that may arise after discharge.    "

## 2023-11-28 NOTE — INTERVAL H&P NOTE
H&P updated. The patient was examined and is noted to be in early labor. Contractions since this morning. Have continued every 2-5 minutes. She represented this evening with continued contractions. Breathing through them. Contractions every 2-5 minutes. Has now made cervical change since being here. Initially closed. Now 1/80/-3. NST with an occasional variables but overall reactive and reassuring. She is to stay for repeat c/s. She does desire tubal.  Risks, benefits, and alternatives of permanent sterilization were discussed with the patient in detail including feelings of regret. Intraoperative risks of bleeding and damage to surrounding organs, including but not limited to intestine, bladder and ureter, were explained. Management of these were also explained. Postoperative complications such as infection, pneumonia, DVT, and bleeding were explained. The importance of compliance with postoperative restrictions was discussed. Laparoscopic options were discussed. Success and failure rates were discussed. Increased risk of ectopic pregnancy was explained. In addition, reversible forms of contraception were reviewed such as the pill, the patch/ring, the shot, the implant, and the IUD.     Specifically, bilateral salpingectomy was discussed.  Reduction in fallopian tube cancer was discussed as well as potential decrease in ovarian cancer risk discussed.  Irreversible nature of this approach as well as the need for at least one additional laparoscopic incision was discussed. Discussed that if she does desire future fertility, that [employer] (ie IVF), would be needed, of which is of significant financial burden. She expressed understanding of the above.     All of the patient's questions were answered to her satisfaction. She was encouraged to return for an additional appointment if she had further questions. She verbalized understanding of the above and wished to proceed with the outlined plan  - repeat c/s with bilateral tubal ligation.

## 2023-11-28 NOTE — PROGRESS NOTES
ALFA Pendleton  [facility]  [address]  [place] [address]  Office [phone]  Fax [phone]      [facility]   PROGRESS NOTE    Post-Op Day 1 S/P   Subjective     Patient reports:  Pain is well controlled with  ERAS protocol, and she is still having control from the spinal less than 24 hours ago .  She is ambulating. Tolerating diet. Voiding -  due to void status post andino catheter removal earlier this morning ; no flatus or bowel movement yet reported..  Vaginal bleeding is as much as expected.    She is breastfeeding and reports the infant is latching.       Objective      Vitals: Vital Signs Range for the last 24 hours  Temperature: Temp:  [97.4 °F (36.3 °C)-98.1 °F (36.7 °C)] 97.6 °F (36.4 °C)   Temp Source: Temp src: Temporal   BP: BP: (103-131)/(41-90) 121/54   Pulse: Heart Rate:  [] 78   Respirations: Resp:  [16-20] 18   SPO2: SpO2:  [94 %-100 %] 97 %   O2 Amount (l/min):     O2 Devices Device (Oxygen Therapy): room air   Weight: Weight:  [88.5 kg (195 lb)] 88.5 kg (195 lb)            Physical Exam    Lungs Respirations even and unlabored.   Abdomen Soft. Fundus firm, non-tender.   Incision  no drainage, no erythema, no swelling, well approximated, skin glue dressing intact   Extremities no edema, redness or tenderness in the calves or thighs     I reviewed the patient's new clinical results.  Lab Results (last 24 hours)       Procedure Component Value Units Date/Time    CBC & Differential [[id number]]  (Abnormal) Collected: 23 1035    Specimen: Blood Updated: 23 1110    Narrative:      The following orders were created for panel order CBC & Differential.  Procedure                               Abnormality         Status                     ---------                               -----------         ------                     CBC Auto Differential[[id number]]        Abnormal            Final result                 Please view results for these  tests on the individual orders.    CBC Auto Differential [[id number]]  (Abnormal) Collected: 11/28/23 1035    Specimen: Blood Updated: 11/28/23 1110     WBC 13.91 10*3/mm3      RBC 3.21 10*6/mm3      Hemoglobin 8.5 g/dL      Hematocrit 28.3 %      MCV 88.2 fL      MCH 26.5 pg      MCHC 30.0 g/dL      RDW 14.1 %      RDW-SD 45.6 fl      MPV 10.3 fL      Platelets 277 10*3/mm3      Neutrophil % 78.1 %      Lymphocyte % 13.9 %      Monocyte % 6.8 %      Eosinophil % 0.4 %      Basophil % 0.1 %      Immature Grans % 0.7 %      Neutrophils, Absolute 10.86 10*3/mm3      Lymphocytes, Absolute 1.93 10*3/mm3      Monocytes, Absolute 0.94 10*3/mm3      Eosinophils, Absolute 0.06 10*3/mm3      Basophils, Absolute 0.02 10*3/mm3      Immature Grans, Absolute 0.10 10*3/mm3      nRBC 0.0 /100 WBC     Tissue Pathology Exam [[id number]] Collected: 11/27/23 2206    Specimen: Tissue from Placenta; Tissue from Fallopian Tube, Left; Tissue from Fallopian Tube, Right Updated: 11/28/23 0818    Fentanyl, Urine - Urine, Clean Catch [[id number]]  (Normal) Collected: 11/27/23 2110    Specimen: Urine, Clean Catch Updated: 11/27/23 2128     Fentanyl, Urine Negative    Narrative:      Negative Threshold:      Fentanyl 5 ng/mL     The normal value for the drug tested is negative. This report includes final unconfirmed screening results to be used for medical treatment purposes only. Unconfirmed results must not be used for non-medical purposes such as employment or legal testing. Clinical consideration should be applied to any drug of abuse test, particularly when unconfirmed results are used.           Urine Drug Screen - Urine, Clean Catch [[id number]]  (Normal) Collected: 11/27/23 2110    Specimen: Urine, Clean Catch Updated: 11/27/23 2127     THC, Screen, Urine Negative     Phencyclidine (PCP), Urine Negative     Cocaine Screen, Urine Negative     Methamphetamine, Ur Negative     Opiate Screen Negative     Amphetamine Screen, Urine Negative      Benzodiazepine Screen, Urine Negative     Tricyclic Antidepressants Screen Negative     Methadone Screen, Urine Negative     Barbiturates Screen, Urine Negative     Oxycodone Screen, Urine Negative     Buprenorphine, Screen, Urine Negative    Narrative:      Cutoff For Drugs Screened:    Amphetamines               500 ng/ml  Barbiturates               200 ng/ml  Benzodiazepines            150 ng/ml  Cocaine                    150 ng/ml  Methadone                  200 ng/ml  Opiates                    100 ng/ml  Phencyclidine               25 ng/ml  THC                         50 ng/ml  Methamphetamine            500 ng/ml  Tricyclic Antidepressants  300 ng/ml  Oxycodone                  100 ng/ml  Buprenorphine               10 ng/ml    The normal value for all drugs tested is negative. This report includes unconfirmed screening results, with the cutoff values listed, to be used for medical treatment purposes only.  Unconfirmed results must not be used for non-medical purposes such as employment or legal testing.  Clinical consideration should be applied to any drug of abuse test, particularly when unconfirmed results are used.      CBC (No Diff) [[id number]]  (Abnormal) Collected: 11/27/23 2050    Specimen: Blood Updated: 11/27/23 2108     WBC 10.86 10*3/mm3      RBC 3.91 10*6/mm3      Hemoglobin 10.4 g/dL      Hematocrit 33.5 %      MCV 85.7 fL      MCH 26.6 pg      MCHC 31.0 g/dL      RDW 14.2 %      RDW-SD 43.9 fl      MPV 10.5 fL      Platelets 303 10*3/mm3     Chlamydia trachomatis, Neisseria gonorrhoeae, PCR w/ confirmation - Swab, Vagina [[id number]] Resulted: 05/25/23    Specimen: Swab from Vagina Updated: 11/27/23 2058     External Chlamydia Screen Negative    Gonorrhea Screen - Swab, [[id number]] Resulted: 05/25/23    Specimen: Swab Updated: 11/27/23 2058     External Gonorrhea Screen Negative    Hepatitis B Surface Antigen [[id number]] Resulted: 05/25/23    Specimen: Blood Updated: 11/27/23 2058     External  Hepatitis B Surface Ag Negative    Rubella Antibody, IgG [[id number]] Resulted: 05/25/23    Specimen: Blood Updated: 11/27/23 2058     External Rubella Qual Immune    HIV-1 Antibody, EIA [[id number]] Resulted: 05/25/23    Specimen: Blood Updated: 11/27/23 2058     External HIV Antibody Negative            External Prenatal Results       Pregnancy Outside Results - Transcribed From Office Records - See Scanned Records For Details       Test Value Date Time    ABO  B  11/27/23 2050    Rh  Positive  11/27/23 2050    Antibody Screen  Negative  11/27/23 2050    Varicella IgG       Rubella ^ Immune  05/25/23     Hgb  8.5 g/dL 11/28/23 1035       10.4 g/dL 11/27/23 2050       11.1 g/dL 09/29/23 1025    Hct  28.3 % 11/28/23 1035       33.5 % 11/27/23 2050    Glucose Fasting GTT       Glucose Tolerance Test 1 hour       Glucose Tolerance Test 3 hour       Gonorrhea (discrete) ^ Negative  05/25/23     Chlamydia (discrete) ^ Negative  05/25/23     RPR       VDRL       Syphilis Antibody       HBsAg ^ Negative  05/25/23     Herpes Simplex Virus PCR       Herpes Simplex VIrus Culture       HIV ^ Negative  05/25/23     Hep C RNA Quant PCR       Hep C Antibody       AFP       Group B Strep       GBS Susceptibility to Clindamycin       GBS Susceptibility to Erythromycin       Fetal Fibronectin       Genetic Testing, Maternal Blood                 Drug Screening       Test Value Date Time    Urine Drug Screen       Amphetamine Screen  Negative  11/27/23 2110    Barbiturate Screen  Negative  11/27/23 2110    Benzodiazepine Screen  Negative  11/27/23 2110    Methadone Screen  Negative  11/27/23 2110    Phencyclidine Screen  Negative  11/27/23 2110    Opiates Screen  Negative  11/27/23 2110    THC Screen  Negative  11/27/23 2110    Cocaine Screen       Propoxyphene Screen       Buprenorphine Screen  Negative  11/27/23 2110    Methamphetamine Screen       Oxycodone Screen  Negative  11/27/23 2110    Tricyclic Antidepressants Screen   Negative  23              Legend    ^: Historical                            Assessment & Plan        Normal labor    Previous  delivery, antepartum    Previous  section      Assessment:    Alie Cleary is Day 1  post-partum  , Low Transverse   .       Plan:  Continue with present postoperative and postpartum plan of care. Lactation support as needed. ERAS protocol reviewed, especially activity, chewing gum, and increasing water for gastric motility.        This note has been signed electronically.    Deandra Jimenez DNP, APRN, CNM, RNC-OB  2023  11:50 CST

## 2023-11-28 NOTE — PLAN OF CARE
Goal Outcome Evaluation:      VSS. Patient will have andino removed this AM. Tolerating PO intake. ALT incision with glue, fundus firm, midline UU with scant bleeding. Patient tolerating PRN medications for itching, states itching improved. Responding to infant cues.

## 2023-11-29 LAB
LAB AP CASE REPORT: NORMAL
Lab: NORMAL
PATH REPORT.FINAL DX SPEC: NORMAL
PATH REPORT.GROSS SPEC: NORMAL

## 2023-11-29 PROCEDURE — 99231 SBSQ HOSP IP/OBS SF/LOW 25: CPT | Performed by: OBSTETRICS & GYNECOLOGY

## 2023-11-29 PROCEDURE — 25010000002 KETOROLAC TROMETHAMINE PER 15 MG: Performed by: OBSTETRICS & GYNECOLOGY

## 2023-11-29 RX ADMIN — IBUPROFEN 600 MG: 600 TABLET, FILM COATED ORAL at 05:41

## 2023-11-29 RX ADMIN — ACETAMINOPHEN 650 MG: 325 TABLET, FILM COATED ORAL at 08:52

## 2023-11-29 RX ADMIN — IBUPROFEN 600 MG: 600 TABLET, FILM COATED ORAL at 11:48

## 2023-11-29 RX ADMIN — IBUPROFEN 600 MG: 600 TABLET, FILM COATED ORAL at 18:04

## 2023-11-29 RX ADMIN — PRENATAL VIT W/ FE FUMARATE-FA TAB 27-0.8 MG 1 TABLET: 27-0.8 TAB at 08:52

## 2023-11-29 RX ADMIN — ACETAMINOPHEN 650 MG: 325 TABLET, FILM COATED ORAL at 02:26

## 2023-11-29 RX ADMIN — KETOROLAC TROMETHAMINE 30 MG: 30 INJECTION, SOLUTION INTRAMUSCULAR; INTRAVENOUS at 00:51

## 2023-11-29 RX ADMIN — ACETAMINOPHEN 650 MG: 325 TABLET, FILM COATED ORAL at 20:30

## 2023-11-29 RX ADMIN — ACETAMINOPHEN 650 MG: 325 TABLET, FILM COATED ORAL at 15:03

## 2023-11-29 NOTE — PLAN OF CARE
Goal Outcome Evaluation:           Progress: (P) improving  Outcome Evaluation: (P) VSS, Fundus firm at the U scant lochia, low transverse incision open to air with glue, breastfeeding and bonding well with infant, Voiding and ambulating.

## 2023-11-29 NOTE — LACTATION NOTE
Mother's Name: Alie  Phone #: [phone]  Infant Name: Lars  : 23  Gestation: 39w0d  Day of life: 2  Birth weight:  7-13.2 (3550 g) Discharge weight:7-6.5 (3359g)  Weight Loss: -5.38%  24 hour Summary of Feeds: 8 BF + Formula X 1 (31 ml)  Voids: 6 Stools: 4  Assistive devices (shields, shells, etc): None  Significant Maternal history: , Fibromyalgia, Former Smoker   Maternal Concerns: None verbalized  Maternal Goal: Breastfeed to provide colostrum. Supplement as needed. Unsure of long term plan. Attempted to BF first child who is now 14 years old. Had difficulty latching. Pumped for 6 weeks.   Mother's Medications: PNV, Protonix   Breastpump for home: Yes  Ped follow up appt:     Assisted per pt request with waking infant, positioniing, and latching. Patient able to easily hand express large drops of colostrum. Encouraged doing so with each latch and finger feeding drops when infant reluctant to suck or latch and any other opportunity to ensure adequate breast stimulation and intake for infant. Discussed pumping vs direct breastfeeding. Recommended pumping regardless of expression of milk, each time she bottle feeds infant to protect her supply. Praised patient. Assisted with football hold for 15 minutes on right then ventral on left. Offered assist as needed.     Instructed patient our lactation team is here for continued support throughout their breastfeeding journey. Our team has encouraged patient to call with any questions or concerns that may arise after discharge.

## 2023-11-29 NOTE — PROGRESS NOTES
ALFA Pendleton  [facility]  [address]  [place] [address]  Office [phone]  Fax [phone]      [facility]   PROGRESS NOTE    Post-Op Day 2 S/P   Subjective     Patient reports:  Pain is well controlled with  ERAS protocol, utilizing Tylenol, Motrin, and Oxycodone .  She is ambulating. Tolerating diet. Voiding - without difficulty; flatus reported..  Vaginal bleeding is as much as expected.    Breastfeeding and supplementing but has had difficulty with last couple of feedings.      Objective      Vitals: Vital Signs Range for the last 24 hours  Temperature: Temp:  [97.7 °F (36.5 °C)-98 °F (36.7 °C)] 98 °F (36.7 °C)   Temp Source: Temp src: Oral   BP: BP: (110-124)/(49-72) 111/72   Pulse: Heart Rate:  [74-92] 88   Respirations: Resp:  [18] 18   SPO2: SpO2:  [97 %-98 %] 98 %   O2 Amount (l/min):     O2 Devices Device (Oxygen Therapy): room air   Weight:              Physical Exam    Lungs Respirations even and unlabored.   Abdomen Soft, Fundus firm and non-tender.   Incision  healing well, no drainage, no erythema, no swelling, well approximated, skin glue dressing intact   Extremities edema 2+; Calves non-tender. MAEW.     I reviewed the patient's new clinical results.  Lab Results (last 24 hours)       Procedure Component Value Units Date/Time    CBC & Differential [[id number]]  (Abnormal) Collected: 23    Specimen: Blood Updated: 23 1110    Narrative:      The following orders were created for panel order CBC & Differential.  Procedure                               Abnormality         Status                     ---------                               -----------         ------                     CBC Auto Differential[[id number]]        Abnormal            Final result                 Please view results for these tests on the individual orders.    CBC Auto Differential [[id number]]  (Abnormal) Collected: 23 1035    Specimen: Blood Updated:  11/28/23 1110     WBC 13.91 10*3/mm3      RBC 3.21 10*6/mm3      Hemoglobin 8.5 g/dL      Hematocrit 28.3 %      MCV 88.2 fL      MCH 26.5 pg      MCHC 30.0 g/dL      RDW 14.1 %      RDW-SD 45.6 fl      MPV 10.3 fL      Platelets 277 10*3/mm3      Neutrophil % 78.1 %      Lymphocyte % 13.9 %      Monocyte % 6.8 %      Eosinophil % 0.4 %      Basophil % 0.1 %      Immature Grans % 0.7 %      Neutrophils, Absolute 10.86 10*3/mm3      Lymphocytes, Absolute 1.93 10*3/mm3      Monocytes, Absolute 0.94 10*3/mm3      Eosinophils, Absolute 0.06 10*3/mm3      Basophils, Absolute 0.02 10*3/mm3      Immature Grans, Absolute 0.10 10*3/mm3      nRBC 0.0 /100 WBC     Tissue Pathology Exam [[id number]] Collected: 11/27/23 2206    Specimen: Tissue from Placenta; Tissue from Fallopian Tube, Left; Tissue from Fallopian Tube, Right Updated: 11/28/23 0818            External Prenatal Results       Pregnancy Outside Results - Transcribed From Office Records - See Scanned Records For Details       Test Value Date Time    ABO  B  11/27/23 2050    Rh  Positive  11/27/23 2050    Antibody Screen  Negative  11/27/23 2050    Varicella IgG       Rubella ^ Immune  05/25/23     Hgb  8.5 g/dL 11/28/23 1035       10.4 g/dL 11/27/23 2050       11.1 g/dL 09/29/23 1025    Hct  28.3 % 11/28/23 1035       33.5 % 11/27/23 2050    Glucose Fasting GTT       Glucose Tolerance Test 1 hour       Glucose Tolerance Test 3 hour       Gonorrhea (discrete) ^ Negative  05/25/23     Chlamydia (discrete) ^ Negative  05/25/23     RPR       VDRL       Syphilis Antibody       HBsAg ^ Negative  05/25/23     Herpes Simplex Virus PCR       Herpes Simplex VIrus Culture       HIV ^ Negative  05/25/23     Hep C RNA Quant PCR       Hep C Antibody       AFP       Group B Strep       GBS Susceptibility to Clindamycin       GBS Susceptibility to Erythromycin       Fetal Fibronectin       Genetic Testing, Maternal Blood                 Drug Screening       Test Value Date Time     Urine Drug Screen       Amphetamine Screen  Negative  23    Barbiturate Screen  Negative  23    Benzodiazepine Screen  Negative  23    Methadone Screen  Negative  23    Phencyclidine Screen  Negative  23    Opiates Screen  Negative  23    THC Screen  Negative  23    Cocaine Screen       Propoxyphene Screen       Buprenorphine Screen  Negative  23    Methamphetamine Screen       Oxycodone Screen  Negative  23    Tricyclic Antidepressants Screen  Negative  23              Legend    ^: Historical                            Assessment & Plan        Normal labor    Previous  delivery, antepartum    Previous  section      Assessment:    Alie Cleary is Day 2  post-partum  , Low Transverse   .       Plan:  Continue postpartum and postoperative plan of care. Inpatient lactation support. Possible plan for discharge tomorrow.         This note has been signed electronically.    Deandra Jimenez DNP, APRN, CNM, RNC-OB  2023  07:48 CST    I was present for rounds and agree with plan of care.

## 2023-11-29 NOTE — PLAN OF CARE
Goal Outcome Evaluation:  Plan of Care Reviewed With: patient        Progress: improving  Outcome Evaluation: VSS, ff ml uu scant lochia, alt incision with glue cdi, patient unable to void following catheter removal and has been straight catheterized x2 this shift with 200 and 725ml out respectively - patient due to void by 2300. ambulating and breastfeeding, significant other and children at bedside

## 2023-11-30 ENCOUNTER — TELEPHONE (OUTPATIENT)
Dept: PSYCHIATRY | Facility: CLINIC | Age: 33
End: 2023-11-30
Payer: MEDICAID

## 2023-11-30 PROCEDURE — 99231 SBSQ HOSP IP/OBS SF/LOW 25: CPT | Performed by: ADVANCED PRACTICE MIDWIFE

## 2023-11-30 RX ORDER — HYDROXYZINE HYDROCHLORIDE 25 MG/1
50 TABLET, FILM COATED ORAL EVERY 6 HOURS PRN
Status: DISCONTINUED | OUTPATIENT
Start: 2023-11-30 | End: 2023-12-01 | Stop reason: HOSPADM

## 2023-11-30 RX ADMIN — IBUPROFEN 600 MG: 600 TABLET, FILM COATED ORAL at 23:38

## 2023-11-30 RX ADMIN — IBUPROFEN 600 MG: 600 TABLET, FILM COATED ORAL at 18:14

## 2023-11-30 RX ADMIN — IBUPROFEN 600 MG: 600 TABLET, FILM COATED ORAL at 00:12

## 2023-11-30 RX ADMIN — ACETAMINOPHEN 650 MG: 325 TABLET, FILM COATED ORAL at 21:15

## 2023-11-30 RX ADMIN — ACETAMINOPHEN 650 MG: 325 TABLET, FILM COATED ORAL at 14:29

## 2023-11-30 RX ADMIN — IBUPROFEN 600 MG: 600 TABLET, FILM COATED ORAL at 06:07

## 2023-11-30 RX ADMIN — ACETAMINOPHEN 650 MG: 325 TABLET, FILM COATED ORAL at 09:12

## 2023-11-30 RX ADMIN — PRENATAL VIT W/ FE FUMARATE-FA TAB 27-0.8 MG 1 TABLET: 27-0.8 TAB at 09:12

## 2023-11-30 RX ADMIN — IBUPROFEN 600 MG: 600 TABLET, FILM COATED ORAL at 12:01

## 2023-11-30 RX ADMIN — ACETAMINOPHEN 650 MG: 325 TABLET, FILM COATED ORAL at 02:37

## 2023-11-30 NOTE — CASE MANAGEMENT/SOCIAL WORK
Discussion with patient re: nursing concerns.  Pt saying she lives with S/O and her 14 year old son, her S/O'ers 3 other children ages 13,8 & 7.  She did deny S/O verbally or physically abusing her or children.  Did call [phone] and spoke with Yoly there for 30 min to report case. Informed of situation and read note entered by Leslie RN to her.  Was told to call back in AM to see if report taken, received ID number [id number].  Pt saying she is not depressed but does have clinical depression.  With pt's anger this SW did suggest possible psych consult, RN reaching out to MD to see if he wants to place order.  Pt stated money is not an issue. She is upset saying S/O is not helpful with things being done around the home, getting food etc.  She does admit getting therapy off and on in the past, had a traumatic childhood. She is planning to move to a place she has in [place] as soon as she is able.  She has no family nor friends to assist her.  Told her to reach out to physician if bad feeling worsen or do not improve.  Did provide pt [place] community resource packet as well as Behavioral Health packet.  Please inform if any other issues or concerns arise.

## 2023-11-30 NOTE — NURSING NOTE
"Around 0800 Angelica, [facility] informed me that when she was gettting the patients vital signs the patient voiced many concerns. Angelica stated she saw the patient aggressively physically hitting herself in the head repeatedly with both of her hands. The mother stated she has no thoughts of harming herself or her baby. She also stated that her significant other is not verbally or physically abusive. The mother did state to the PCT \"I want to snap [the kids] necks and smash their faces in.\" The patient states she has a history of clinical depression and postpartum depression after her first pregnancy about 14 years ago. The mothers EPDS was an 8. She states that she does not want to be prescribed any antidepressives due to her history of drug abuse in the past. The mother states she is older now and has better coping skills compared to after her first pregnancy. The mother tearfully voiced her frustration stating \"I am not depressed, I am pissed off!\" She proceeded to express her frustration with her significant other and his 3 children. She states that she is the only one to do house work, grocery shop, and she pays almost all of the bills. She states concern of bringing the baby home to her significant others 3 children who she states 2 of the 3 are always sick. She says they do not listen well and she is worried they will cough on the baby and get her sick despite being instructed not to.     ALFA Emanuel notified and  was consulted. I also informed BYARON Shell.    Deandra Jimenez stated the patient has PRN atarax for anxiety if needed. She stated I can continue hourly rounding and that the baby is okay to be kept at bedside with the mother.    "

## 2023-11-30 NOTE — PROGRESS NOTES
ALFA Pendleton  [facility]  [address]  [place] [address]  Office [phone]  Fax [phone]      [facility]   PROGRESS NOTE    Post-Op Day 3 S/P   Subjective     Patient reports:  Pain is well controlled with  ERAS protocol .  She is ambulating. Tolerating diet. Voiding - without difficulty; flatus reported..  Vaginal bleeding is light.      Objective      Vitals: Vital Signs Range for the last 24 hours  Temperature: Temp:  [97.2 °F (36.2 °C)-98.4 °F (36.9 °C)] 97.5 °F (36.4 °C)   Temp Source: Temp src: Temporal   BP: BP: (119-124)/(60-75) 119/70   Pulse: Heart Rate:  [82-98] 82   Respirations: Resp:  [18-20] 18   SPO2: SpO2:  [97 %-99 %] 98 %   O2 Amount (l/min):     O2 Devices Device (Oxygen Therapy): room air   Weight:              Physical Exam    Lungs Respirations even and unlabored.   Abdomen Soft. Fundus firm, non-tender.   Incision  healing well, no drainage, no erythema, no swelling, well approximated, skin glue intact   Extremities 1+ edema. Moves extremities well. Non-tender.     I reviewed the patient's new clinical results.  Lab Results (last 24 hours)       Procedure Component Value Units Date/Time    Tissue Pathology Exam [[id number]] Collected: 23    Specimen: Tissue from Placenta; Tissue from Fallopian Tube, Left; Tissue from Fallopian Tube, Right Updated: 23 7520     Note to Patients --     This report may contain a detailed description of human tissue sent by a health care provider to the laboratory for pathologic evaluation. The content of this report is essential for diagnosis and may provide important critical findings. This information may be unfamiliar to patients to review without a medical professional present. It is advised that the patient review this report in the presence of a health care provider who can answer questions and explain the results.         Case Report --     Surgical Pathology Report                          "Case: [id number]                                  Authorizing Provider:  Cali Enrique MD         Collected:           11/27/2023 10:06 PM          Ordering Location:     [facility]     Received:            11/28/2023 08:18 AM                                 LABOR DELIVERY                                                               Pathologist:           Radhika Massey DO                                                         Specimens:   1) - Placenta                                                                                       2) - Fallopian Tube, Left                                                                           3) - Fallopian Tube, Right                                                                  Final Diagnosis --     (1)  Placenta, delivery:           Third trimester placenta, 741 g.           Focal infarction, 0.8 cm in greatest dimension.           Mild intervillous fibrin deposition with focal microcalcifications.           Fetal membranes without diagnostic abnormality.           Three-vessel umbilical cord without inflammation.    (2)   Fallopian tube, \"left\", sterilization.           Complete cross-section of unremarkable fallopian tube.    (3)   Fallopian tube, \"right\", sterilization.           Complete cross-section of unremarkable fallopian tube.         Gross Description --     1. Placenta.  Specimen #1 is received in formalin and labeled with the patient's name, medical record number, accession number and designated \"placenta\". The specimen consists of a round/oval crum placental disc with attached membranes and umbilical cord. The placental disc measures  21.3 x 17.9 x 3.8 cm. The membranes and umbilical cord are removed and the disc weighs 741 g. The maternal surface is red-brown and disrupted, the completeness of the disc cannot be definitively determined. Sectioning reveals red-brown, soft and spongy parenchyma with an area of fibrous deposition " "on the periphery measuring 2.4 cm in greatest dimension.  An area of intraparenchymal hemorrhage is also identified on the periphery measuring 1.5 cm in greatest dimension. The fetal surface is yellow-blue with some discoloration. Subchorionic fibrin comprises less than 5% of the surface. The umbilical cord inserts centrally and measures 36.2 cm in length by 0.9 cm in diameter. The external surface is blue-gray with no significant discoloration. Sectioning reveals three patent vessels with no stricture or thrombus identified. The membranes insert at the margin and are tan-pink and translucent.    Representative section of peripheral disc is submitted in (block 1A). Representative section of central disc is submitted in (block 1B). Representative sections of cord and membranes are submitted in (block 1C).    2. Fallopian Tube, Left.   Received in a formalin filled container labeled with the patient's name, date of birth, and \"left fallopian tube\".  The specimen consists of a segment of fallopian tube measuring 1.4 cm in length by 0.6 cm in diameter.  The serosal surface is pink-blue, smooth and glistening with no fimbria identified.  The cut surface is unremarkable.  Representative sections are submitted in block 2A.    3. Fallopian Tube, Right.   Received in a formalin filled container labeled with the patient's name, date of birth, and \"right fallopian tube\".  The specimen consists of a segment of fallopian tube measuring 1.3 cm in length by 0.6 cm in diameter.  The serosal surface is pink-blue, smooth and glistening with no fimbria identified.  The cut surface is unremarkable.  Representative sections are submitted in block 3A.                  External Prenatal Results       Pregnancy Outside Results - Transcribed From Office Records - See Scanned Records For Details       Test Value Date Time    ABO  B  11/27/23 2050    Rh  Positive  11/27/23 2050    Antibody Screen  Negative  11/27/23 2050    Varicella IgG    "    Rubella ^ Immune  23     Hgb  8.5 g/dL 23 1035       10.4 g/dL 23       11.1 g/dL 23 1025    Hct  28.3 % 23 1035       33.5 % 23    Glucose Fasting GTT       Glucose Tolerance Test 1 hour       Glucose Tolerance Test 3 hour       Gonorrhea (discrete) ^ Negative  23     Chlamydia (discrete) ^ Negative  23     RPR       VDRL       Syphilis Antibody       HBsAg ^ Negative  23     Herpes Simplex Virus PCR       Herpes Simplex VIrus Culture       HIV ^ Negative  23     Hep C RNA Quant PCR       Hep C Antibody       AFP       Group B Strep       GBS Susceptibility to Clindamycin       GBS Susceptibility to Erythromycin       Fetal Fibronectin       Genetic Testing, Maternal Blood                 Drug Screening       Test Value Date Time    Urine Drug Screen       Amphetamine Screen  Negative  23    Barbiturate Screen  Negative  23    Benzodiazepine Screen  Negative  23    Methadone Screen  Negative  23    Phencyclidine Screen  Negative  23    Opiates Screen  Negative  23    THC Screen  Negative  23    Cocaine Screen       Propoxyphene Screen       Buprenorphine Screen  Negative  23    Methamphetamine Screen       Oxycodone Screen  Negative  23    Tricyclic Antidepressants Screen  Negative  23              Legend    ^: Historical                            Assessment & Plan        Normal labor    Previous  delivery, antepartum    Previous  section      Assessment:    Alie Cleary is Day 3  post-partum  , Low Transverse   .       Plan:  Continue postpartum and postoperative plan of care. Patient had planned for discharge today, so discharge instructions provided. After leaving the unit, patient expressed to staff her feelings of being overwhelmed and anxious at the thought of going home. Will hold discharge until  tomorrow. Case management consult. Continued inpatient lactation consultation and support as needed.         This note has been signed electronically.    Deandra Jimenez DNP, APRN, CNM, RNC-OB  11/30/2023  10:19 CST

## 2023-11-30 NOTE — PLAN OF CARE
Goal Outcome Evaluation:           Progress: improving          VSS. Voiding and ambulating. ERAS. PRN pain mediation offered but refused at this time. Breastfeeding and pumping. Responding well to infant cues.  consult placed.

## 2023-11-30 NOTE — LACTATION NOTE
"Mother's Name: Alie  Phone #: [phone]  Infant Name: Lars  : 23  Gestation: 39w0d  Day of life: 3  Birth weight:  7-13.2 (3550 g) Discharge weight:7-4.2 (3295g)  Weight Loss: -7.18%  24 hour Summary of Feeds: 7 BF + 5.3ml MBM Voids: 3 Stools: 2  Assistive devices (shields, shells, etc): nipple shield  Significant Maternal history: , Fibromyalgia, Former Smoker   Maternal Concerns: None verbalized  Maternal Goal: Breastfeed to provide colostrum. Supplement as needed. Unsure of long term plan. Attempted to BF first child who is now 14 years old. Had difficulty latching. Pumped for 6 weeks.   Mother's Medications: PNV, Protonix   Breastpump for home: Yes  Ped follow up appt:     Patient states feedings are going \"ok.\" Discuss the importance of deep latch and positioning for palate stimulation. Discussed milk transitioning and patient has 30ml fresh transitional breastmilk in bottle on bsd table pumped at 0700. Milk labeled and placed in milk refrigerator at nurses station. Offered assistance with feedings or pumping today as needed. Offered support and encouragement as needed.   "

## 2023-11-30 NOTE — CASE MANAGEMENT/SOCIAL WORK
Psych eval scheduled for 9AM tomorrow, Jodee Sanchez Director of  communicating with Nursing re: process.

## 2023-11-30 NOTE — LACTATION NOTE
"This note was copied from a baby's chart.  Lactation consult conducted with pt as she requested help w a feeding . Pt walking around infant in crib in  room upon entry stating infant \"got full\" on R breast; would not take L. Pt very talkative. She reports much pain w feeding on L breast. Upon exam, no observable trauma. Pt states no impairment or bruising, but that it is tender to point of pain when infant latches. Further that finding a position that is not awkward on L  is difficult for pt.  As discussion ensued, infant began vigorous movements in crib with head turning, hand sucking. Educated mother that these were feeding cues. With permission, pt allowed me to completely conduct latching infant to L breast; cross cradle hold. Mother experienced tenderness at onset. Pt winced in pain at one point when infant appeared to tongue thrust nipple into anterior of oral cavity and compress nipple w gums. Latch was quickly disengage by breaking seal. Mother agreeable to second attempt. Infant remained deep into breast, gulping rhythmically. Alie voiced was tender \"but passing\" as feed continued. Maintained pressure at infant's neck, keeping her chin/cheeks deep into breast. Explained to pt that this ensured Caydence would be much less likely to cause her pain while feeding. Mom voiced understanding. Per my guidance, pt replaced my hands with her own -- on infant and her own breast-- compressing breast and keeping infant drinking. Reminded Alie that at the first sign of daughter pushing nipple to front of mouth, or pain, to break suction, and relatch deeply. Pt voiced understanding. Observed feed for approx 10 mins on L. Pt continued feeding upon my exit. Much praise given. Pt planned to pump to relieve fullness after finishing feed on L breast. Bilateral breasts were full per palpation. L>R. Pt using her own pump per her choice.   "

## 2023-11-30 NOTE — LACTATION NOTE
"Mother's Name: Alie  Phone #: [phone]  Infant Name: Lars  : 23  Gestation: 39w0d  Day of life: 2  Birth weight:  7-13.2 (3550 g) Discharge weight:7-6.5 (3359g)  Weight Loss: -5.38%  24 hour Summary of Feeds: 8 BF + Formula X 1 (31 ml)  Voids: 6 Stools: 4  Assistive devices (shields, shells, etc): None  Significant Maternal history: , Fibromyalgia, Former Smoker   Maternal Concerns: None verbalized  Maternal Goal: Breastfeed to provide colostrum. Supplement as needed. Unsure of long term plan. Attempted to BF first child who is now 14 years old. Had difficulty latching. Pumped for 6 weeks.   Mother's Medications: PNV, Protonix   Breastpump for home: Yes  Ped follow up appt:     F/U with mom.  Mom stated she was starting to leak milk now and it is time for infant to eat again.  Infant awake from nursing assessment.  Assisted mom with positioning and latching.  Infant placed in cradle position and latched with a deep latch.  With slight tactile stimulation infant has multiple swallows.  Mom's milk is beginning to transition and round marble size \"knots\" are felt in each breast.  Encouraged mom to massage these towards infant.  Nipples are tender when infant latches and if infant \"bites\" down per mom.  Encouraged to keep infant awake and actively sucking.  If she falls asleep, release suction, and burp or wake up to keep her from slipping to the tip of her nipple.    0235  Infant back to mom.  Mom had pumped after last feeding and had 50 ml in bottle at bedside.  Milk was labeled and placed in breastmilk refrigerator at nurses desk.  Mom wanted to latch while sitting on the bedside.  Pillows used for positioning in cradle position, but mom became anxious because they were sliding around.  Assisted mom with holding infant in position while on right breast.  Infant gulps when mom massages breast.  Moderate stimulation used to keep infant interested and sucking.  Assisted mom with pillows for " "football position on left breast.  Mom grimacing when infant is latched.  Assist with getting a deep latch and mom continued to complain of breast being \"sore\".  Requested a nipple shield.  Attempted to use a 20 mm shield.  Infant would latch, suck, and release nipple shield.  After several attempts, nipple shield removed.  Infant latched immediately and began to gulp with deep jaw drops.  Encouraged mom to relax, and take deep breaths.  Infant remained on breast for 16 min before falling asleep and releasing breast.  Mom praised for great feeding.  Encouraged colostrum and lanolin on breast.  Gave cool gel pads and educated on cleaning and use.     0600  F/U with mom.  Assist mom with waking, positioning, and latching infant in cross cradle position on right breast.  Infant latched.  Deep jaw drops with gulps heard.  Infant latched for 12 minutes before releasing breast and laying head back.  Mom burped infant and infant remained asleep with arms laying out.  Mom concerned that she is \"engorged\" and her \"milk is going to dry up\".  Offered to help mom with pumping.  Advised to only pump for less than 10 minutes to relieve some pressure, not to empty, to avoid further engorgement on left side.  Mom wanted to pump both breast.  Mom continues to complain of pain on both nipples.  Suggested colostrum, lanolin, and cool gel pads after she finishes pumping.    "

## 2023-12-01 VITALS
BODY MASS INDEX: 29.55 KG/M2 | WEIGHT: 195 LBS | TEMPERATURE: 98 F | OXYGEN SATURATION: 98 % | HEIGHT: 68 IN | HEART RATE: 78 BPM | DIASTOLIC BLOOD PRESSURE: 72 MMHG | SYSTOLIC BLOOD PRESSURE: 121 MMHG | RESPIRATION RATE: 18 BRPM

## 2023-12-01 PROCEDURE — 99238 HOSP IP/OBS DSCHRG MGMT 30/<: CPT | Performed by: ADVANCED PRACTICE MIDWIFE

## 2023-12-01 PROCEDURE — 99223 1ST HOSP IP/OBS HIGH 75: CPT | Performed by: PSYCHIATRY & NEUROLOGY

## 2023-12-01 RX ORDER — OXYCODONE HYDROCHLORIDE 5 MG/1
5 TABLET ORAL EVERY 8 HOURS PRN
Qty: 9 TABLET | Refills: 0 | Status: SHIPPED | OUTPATIENT
Start: 2023-12-01 | End: 2023-12-05

## 2023-12-01 RX ORDER — IBUPROFEN 600 MG/1
600 TABLET ORAL EVERY 6 HOURS
Qty: 30 TABLET | Refills: 0 | Status: SHIPPED | OUTPATIENT
Start: 2023-12-01

## 2023-12-01 RX ADMIN — IBUPROFEN 600 MG: 600 TABLET, FILM COATED ORAL at 06:55

## 2023-12-01 RX ADMIN — ACETAMINOPHEN 650 MG: 325 TABLET, FILM COATED ORAL at 08:52

## 2023-12-01 RX ADMIN — ACETAMINOPHEN 650 MG: 325 TABLET, FILM COATED ORAL at 02:47

## 2023-12-01 RX ADMIN — PRENATAL VIT W/ FE FUMARATE-FA TAB 27-0.8 MG 1 TABLET: 27-0.8 TAB at 08:51

## 2023-12-01 RX ADMIN — ACETAMINOPHEN 650 MG: 325 TABLET, FILM COATED ORAL at 15:37

## 2023-12-01 RX ADMIN — OXYCODONE HYDROCHLORIDE 10 MG: 5 TABLET ORAL at 02:49

## 2023-12-01 RX ADMIN — IBUPROFEN 600 MG: 600 TABLET, FILM COATED ORAL at 11:55

## 2023-12-01 NOTE — CONSULTS
" [facility]   PSYCHIATRIC CONSULTATION    Patient Name: Alie Cleary  : 1990  MRN: [id number]  Primary Care Physician:  Provider, No Known  Date of admission: 2023    Referring Provider: Dr. Cali Enrique  Reason for Consultation: Depression    Subjective   Subjective     Chief Complaint: \"I am not depressed, just pissed off.\"    HPI:     Alie Cleary is a 33 y.o. female 4 days postpartum from a  section with delivery of a healthy baby girl.  Patient has a history of postpartum depression, depression, anxiety, and borderline personality disorder.  Has a remote history of substance use and has been sober for greater than 12 years.  Staff reported patient was upset and tearful yesterday and was seen hitting herself in the head, very emotional, and apparently expressed some thoughts of doing will towards her boyfriend's children.    Patient is agreeable to a psychiatric evaluation.  However, she does not feel it is warranted.  Patient is quite adamant that she is not depressed but she is very frustrated and angry.  She has lots of social stressors.  Patient states, \"I just lost my shit yesterday.\"  She denies any suicidal ideations throughout the interview.  She denies any homicidal ideations.  She states that she has no desire to hurt herself or anyone else.  Patient reports that she began thinking about returning home to her boyfriend, his 3 children, her son, and envisioned a very dirty and disorganized house.  She reports this got her very upset.    Patient reports that her boyfriend and his children do nothing to help around the house.  Patient was working up until the day that she began having contractions came into the hospital for delivery of her baby.  She reports that no one in the house other than herself does anything to clean it up.  She reports dishes are piled up in the sink.  States no one does anything to help around the house and does not pick things up.  She reports she " is only one who does laundry.  States that she can get the house clean and tidy including dishes and laundry being done in 1 day and it can be wrecked in less than hour and no one seems to care or be able to  after themselves.  Patient expresses a lot of frustration over her current living environment.  She states that she has made the decision that she is going to go back home to home she has in [place].  States she was ending the end of her pregnancy and knew that she was unable to pull off to move.  She has plans to get a [employer] and pack her and her son's things up in for her and the 2 children to go back to [place].  She is now status post  section and now that she is going to be limited from driving for some time.  She states that is not a question of if she is going to leave her current living situation, but when and is somewhat chagrined that she is going to have to wait a lot longer now while she recovers from the  section and when she and the baby are ready for travel.  She plans on returning home to [place].  Patient had a fiancée in [place] in the home as he is and when he  he left it to her.  States she has a support system and began including parents of her  fiancé, a close friend, and her mother.  States that [place] is a safe environment and a place for her to go.  States that she is always been very independent and able to support herself and plans on doing that again as soon as she can.    Patient reports that she is angry and continues to deny being depressed.  She reports she is angry that no one does anything around the house.  She is also very concerned about people in the household being sick and boyfriend's kids have recently had a cough and have not respected her space.  She is fearful of going home and the kids continuing to cough and possibly have an illness and does not want her  to be subjective this.    Patient states that she has had this level  of emotion for some time.  Reports that what she expressed yesterday is nothing that she has not expressed in the past few months.  She also voices understanding that just having given birth and with the wave of hormones are coming over her that her motion may be amplified.  She voices frustration that she is not able to express emotions and how she is feeling without people getting overly concerned and telling her she is depressed, and feels that people were not truly listening to her.    She denies any thoughts of harm herself throughout the entirety conversation.  She denies any thoughts of harming her current boyfriend, any of her children, or either of her 2 children.  She shows great affection for her baby.  She reports that she is bonded well with the baby.  Review of chart indicates that she is had some difficulty with nursing but has been able to successfully breast-feed the baby, and the baby is thriving and doing well.  Patient talks about her son and her baby in an appropriate fashion.  She shows appropriate warmth and affection towards the baby and smiles brightly when I ask her to hold the baby up to the camera for me to see.    Previous to admission she denied problems with sleep.  She reported normal appetite.  She denied feeling hopeless or helpless.  Reports her energy level was good and that she was maintaining the house and going to work on a daily basis.  Denies isolating.  Reports that she could get some erma from life.  Described herself as being very independent and self-sufficient and has no low self worth deficiencies or deficits.    [facility] has been contacted and they have picked up the case and plan on doing a home visit.  [facility] was contacted by staff yesterday after her [place] motion.  She has denied any abuse or domestic violence from her boyfriend.  Has continued denies suicidal ideation as well as any thoughts of harm to anyone in the household to staff over the past 2 days.    Discussed  that she continues to be this emotional or begins to exhibit symptoms of depression and feeling sad to get help.  She quickly volunteers that she has been given list of resources from the hospital for her to go and get help for depression and anxiety.    She has remote history of opioid and marijuana misuse.  States that she has had to use some oxycodone and has been given a prescription for these on leaving.  States that she has used very little with this medication while in the hospital, and plans on using it very sparingly and hopefully not at all after leaving.  States the only time she needs it is when she coughs, or exerts herself a little too much in her incision begins to hurt.  Voices understanding that she is at risk for a very Labs is a plan for that not to occur.  States that she is motivated to remain sober.  She has been off of all substances for greater than 12 years.    Processed with patient more effective ways to handle frustration and anger suggested follow-up with individual therapist to work on coping strategies and methods to deal with anger and frustration other than meth that she used yesterday that triggered a psychiatric consult.        Review of Systems   All systems were reviewed and negative except for: Incision pain    Personal History     Past Medical History:   Diagnosis Date    Fibromyalgia        Past Surgical History:   Procedure Laterality Date     SECTION       SECTION WITH TUBAL Bilateral 2023    Procedure:  SECTION REPEAT WITH TUBAL;  Surgeon: Cali Enrique MD;  Location: [facility] LABOR DELIVERY;  Service: Obstetrics/Gynecology;  Laterality: Bilateral;    MOUTH SURGERY         Past Psychiatric History: Does not have a current psychiatrist or therapist.  She last saw a therapist in early .  She has not seen a prescriber for medicines for depression for more than 2 years.  She does have a history of being on antidepressants but has not been on  any been more than 2 years.  She does report a history of postpartum depression, but reports it was due to excessive worry about her son.  She reports the previous loss of pregnancy and was very worried about the viability of the pregnancy with her son.    Psychiatric Hospitalizations: No hospitalizations    Suicide Attempts: Denies any history of attempts    Prior Treatment and Medications Tried: History of medication but does not recall what they were        Family History: family history is not on file. Otherwise pertinent FHx was reviewed and not pertinent to current issue.    Social History:     Born and raised in [place] [facility] [place].  She is a high school graduate with some college.  She has never been .  She has 2 children.  States mother lost her legs when she was 4 years old and she had to learn to be independent because mother could not help her and she also had to help her mom some.  She states that she has a good support system.    Social History     Socioeconomic History    Marital status: Single    Number of children: 1   Tobacco Use    Smoking status: Former     Packs/day: 0.25     Years: 16.00     Additional pack years: 0.00     Total pack years: 4.00     Types: Cigarettes     Quit date: 2023     Years since quittin.7    Smokeless tobacco: Never   Vaping Use    Vaping Use: Never used   Substance and Sexual Activity    Alcohol use: Not Currently    Drug use: Not Currently     Types: Marijuana     Comment: quit 15 years ago    Sexual activity: Yes     Partners: Male     Birth control/protection: None       Substance Abuse History: reports that she quit smoking about 9 months ago. Her smoking use included cigarettes. She has a 4.00 pack-year smoking history. She has never used smokeless tobacco. She reports that she does not currently use alcohol. She reports that she does not currently use drugs after having used the following drugs: Marijuana.    Home Medications:  ibuprofen, oxyCODONE,  "pantoprazole, and prenatal vitamin      Allergies:  No Known Allergies    Objective   Objective     Vitals:   Temp:  [97.2 °F (36.2 °C)-98.3 °F (36.8 °C)] 98 °F (36.7 °C)  Heart Rate:  [] 78  Resp:  [16-18] 18  BP: (115-127)/(66-74) 121/72  Physical Exam       Mental Status Exam:     Awake, alert, oriented female appears appropriate for stated age.  Patient participates freely in exam.  She has no psychomotor agitation or restlessness.  She is emotional during the exam and gets tearful at times, but is while discussing troubling matters and she is able to calm herself down appropriately.  Appears to be of average intelligence and a reliable historian.    Hygiene:   good  Cooperation:  Cooperative  Eye Contact:  Good  Psychomotor Behavior:  Appropriate  Mood: \"Frustrated\"  Affect:  Appropriate and tearful at times, blunted  Speech:  Normal  Language: Appropriate, relevant  Thought Process:  Goal directed and Linear  Thought Content:  Normal  Suicidal:  None  Homicidal:  None  Hallucinations:  None  Delusion:  None  Memory:  Intact  Orientation:  Person, Place, Time, and Situation  Reliability:  good  Insight:  Good  Judgement:  Good  Impulse Control:  Good    Result Review    Result Review:  I have personally reviewed the results from the time of this admission to 2023 11:58 CST and agree with these findings:  []  Laboratory  []  Microbiology  []  Radiology  []  EKG/Telemetry   []  Cardiology/Vascular   []  Pathology  []  Old records  []  Other:  Most notable findings include:     Assessment & Plan   Assessment / Plan     Brief Patient Summary:  Alie Cleary is a 33 y.o. female who female with a history of depression,, anxiety, postpartum depression that is 4 days postpartum after .  Patient with increased intensity of emotion and suspect partly due to hormone changes secondary to being postpartum.  No acute symptoms.    Active Hospital Problems:  Active Hospital Problems    Diagnosis     " **Normal labor     Previous  section     Previous  delivery, antepartum          Plan:   1) patient denies acute depression and does not appear to be depressed but frustrated over living situation.  She is also frustrated that she is going to be stuck there longer than she would have liked to have been while she recovers from her .  She is future oriented and goal directed.  She has a plan for moving back to [place] closer to her support group.  No voice suicidal or homicidal ideation throughout the interview.  Has appropriate responses to her baby.    2) patient does not want any medication at this time.  Patient states that if her emotions do not subside or she begins to have depressive symptoms in the next 2 weeks that she will go and be assessed initiate medications for depression if warranted at that time.    3) CPS has been contacted and have picked up her case and they plan home visits.    4) no need for acute inpatient psychiatric care and may discharge per psych.        Part of this note may be an electronic transcription/translation of spoken language to printed text using the Dragon Dictation System.    Electronically signed by Peter Zayas MD, 23, 11:58 AM CST.   care and may discharge per psych.        Part of this note may be an electronic transcription/translation of spoken language to printed text using the Dragon Dictation System.    Electronically signed by Peter Zayas MD, 12/01/23, 11:58 AM CST.    Electronically signed by Peter Zayas MD, 12/08/23, 11:32 AM CST.

## 2023-12-01 NOTE — NURSING NOTE
After the patient was done with the meeting she called and had the nurse come in and get the computer. Spoke with Dr. Zayas. He stated that the patient can be discharged home when she is medically ready. Stated she has access to resources that she needs. No adding to medication at this time

## 2023-12-01 NOTE — LACTATION NOTE
Mother's Name: Alie  Phone #: [phone]  Infant Name: Lars  : 23  Gestation: 39w0d  Day of life: 4  Birth weight:  7-13.2 (3550 g) Discharge weight:7-6.2 (3350g) - gained from yesterday  Weight Loss: -5.63%  24 hour Summary of Feeds: 7 BF + 5.3ml MBM Voids: 3 Stools: 2  Assistive devices (shields, shells, etc): nipple shield  Significant Maternal history: , Fibromyalgia, Former Smoker   Maternal Concerns: None verbalized  Maternal Goal: Breastfeed to provide colostrum. Supplement as needed. Unsure of long term plan. Attempted to BF first child who is now 14 years old. Had difficulty latching. Pumped for 6 weeks.   Mother's Medications: PNV, Protonix   Breastpump for home: Yes  Ped follow up appt: Marta on 23 @ [facility], Dr Blackwell on 2023 @ 9:45 AM     Bilateral breasts engorged. Infant actively nursing on right breast in cradle hold with deep jaw dropping sucks and audible swallows. Infant paused for BM then continued nursing. Discussed the need to soften breasts and prevent engorgement/mastitis with pumping after feeds as needed. Reviewed discharged breast feeding packet. Recommended patient pumping with hospital grade pump twice, back to back before discharging home to relieve firmness in breasts. Discussed milk storage and answered all questions. Offered support and discussed plan of care for outpatient appointment tomorrow.     Signs of Milk: Fullness, firmness, heaviness of breasts, leaking of milk.  Signs of Good Feed: Breast fullness prior to feed, breasts soft and comfortable after feeding. Infant content after feeding: calm, sleepy, relaxed and without continued hunger cues.  Signs of Plugged Ducts, Engorgement and Mastitis: Plugged ducts (milk entrapment in milk ducts)- small tender knots that often feel like little beans under breast tissue, usually tender. Massage on these areas of concern while breastfeeding or pumping to promote emptying.   Engorgement- fluid or excess milk,  breasts become uncomfortably full, tight, firm (compare to the firmness of your cheek (mild), chin (moderate) or forehead (severe). First line of treatment should be to BREASTFEED, if breasts remain full feeling after a feeding, it may be necessary to pump, ONLY UNTIL BREASTS ARE SOFT AND COMFORTABLE. DO NOT OVER PUMP (complete emptying of breasts can trigger even more milk which will cause continued, recurrent Engorgement).  Mastitis- Infection of the breast tissue, most often caused by plugged ducts that are not adequately treated by emptying, recurrent trauma to nipples or breasts (cracked or bleeding nipples). Signs: redness, swelling, tender knots or fever to breasts as well as generalized fever >101 degrees F that is often sudden onset. Treatment of mastitis, BREASTFEED! Pump after breastfeeding to achieve COMPLETE emptying of affected breast, utilizing massage to areas of concern, may use cold compress to affected area only after breast emptying. May take anti-inflammatories i.e. Ibuprofen, Motrin. CALL your OB for assessment and continued treatment with Antibiotics to adequately treat mastitis.  Infant Care: Over the first 2 weeks it is important to keep record of infant's feeding routine (feeding times and durations), wet and dirty diaper frequency, stool color and any spit ups that may occur.  Keep in mind, ALL babies will lose some weight initially (usually no more than 10% by day 3). Until infant returns to/ surpasses birth weight (which can take up to 2 weeks), it is important to offer feedings AT LEAST EVERY 3 HOURS. Remember, if you choose to supplement infant with formula or previously pumped milk, you should always pump in replacement of that feeding in order to promote and maintain a healthy milk supply!  Maternal Care: REST, sleep when the infant sleeps, stay hydrated (water is optimal) drink to thirst, increase caloric intake - breastfeeding mother's need an ADDITIONAL 500 calories per day , eat  3 meals/day as well as snacks in between, limit CAFFIENE intake to 2 cups/day. Ask your significant other, family members or friends for help when needed, taking advantage of meal trains, allowing others to help with laundry, house chores, etc can help you focus on what is most important early on after delivery… you and your infant, and breastfeeding!   Medications to CONTINUE: Prenatal Vitamins are important to continue taking while breastfeeding. Fish oil, magnesium/calcium supplements often are helpful to support Mothers and their milk supply as well. Tylenol, Ibuprofen, regular Zyrtec, Claritin are SAFE if you suffer from seasonal allergies. Flonase is safe and often an effective medication to take if suffering from sinus drainage/pressure.  Medications to AVOID: Benadryl, Sudafed, any medications including “DM” or have a drying effect to sinus drainage will also dry a mother's milk up. Birth control- your OB will want to address birth control options with you usually around 4-6 weeks postpartum, be sure to notify your MD if you continue to breastfeed as some birth controls may significantly decrease your milk supply. Herbals- some herbs may also decrease your milk supply: PEPPERMINT, MENTHOL in any form (candies, essential oils, teas, etc), so check labels and avoid using in excess.  Pumping: Although we encourage you to focus on breastfeeding over the first 2-4 weeks, you will need to plan to begin pumping. We do recommend implementing pumping by the time infant is 4 weeks old. Pump 2-3 times per day immediately AFTER breastfeeding, it is normal to collect very small amounts initially, but the more consistently you pump, the more you will begin to collect. Store collected milk in refrigerator or freezer. You should also begin offering infant a bottle around 4 weeks. Remember to use slow flow nipples and PACE the bottle-feed. A bottle feed should take about as long as a breastfeeding session.

## 2023-12-01 NOTE — NURSING NOTE
Irena, [facility]  was here to see patient, stated she made a plan with her for at home. Stated that here an infant may be discharged together when ready. A copy of her license is in the chart.

## 2023-12-01 NOTE — DISCHARGE INSTR - APPOINTMENTS
Your Doctor has ordered you and your infant an Outpatient Lactation Follow up appointment on 2023 @ 1:00 PM  here at [facility] with one of our lactation support team. You can reach [facility] Lactation Department at [phone].      Our Outpatient Lactation Clinic is located in [place] [address] (formerly [facility]) inside the Outpatient Lab and Imaging Center.  Upon arriving for your appointment Monday - Friday you will need to arrive at the Outpatient Lab and Imaging center located in [place] [address], 15 minutes prior to your appointment to register.  Please sign your name on the sign in slip, have a seat and wait for the admitting staff to call your name; once registered the admitting staff will direct you to the Outpatient Lactation Clinic.       Upon arriving for your appointment on Saturday or Holidays you will need to arrive at Main Registration here at [facility], which is located to the right of the Main [facility] Hospital entrance. Please arrive 15 minutes early to get registered for your Outpatient Lactation Clinic Appointment. Please sign in at Main Registration let them know you are here for your Outpatient Lactation Appointment, they will assist you and direct you to the our Clinic.         ****Appointment with Dr Enrique on  @ 9:45 AM

## 2023-12-01 NOTE — PLAN OF CARE
Goal Outcome Evaluation:              Outcome Evaluation: (P) VSS. FFMLU1 scant lochia. Abdominal binder utilized. Pt showered this shift. Mild to moderate edema to R and L lower extremities. Pt breastfeeding and pumping well. Pt bonding well with infant, voiding, and ambulating

## 2023-12-01 NOTE — PROGRESS NOTES
ALFA Pendleton  [facility]  [address]  [place] [address]  Office [phone]  Fax [phone]      [facility]   PROGRESS NOTE    Post-Op Day 4 S/P   Subjective     Patient reports:  Pain is controlled with ERAS protocol.  She is ambulating. Tolerating diet. Voiding - without difficulty; flatus reported..  Vaginal bleeding is as much as expected.    She relates her mood yesterday was entirely frustration with her home situation, which she has a plan once her incision is healed to resolve the situation. Has not thoughts or plans for harming herself or the infant.       Objective      Vitals: Vital Signs Range for the last 24 hours  Temperature: Temp:  [97.2 °F (36.2 °C)-98.3 °F (36.8 °C)] 98 °F (36.7 °C)   Temp Source: Temp src: Temporal   BP: BP: (115-127)/(66-75) 121/72   Pulse: Heart Rate:  [] 78   Respirations: Resp:  [16-18] 18   SPO2: SpO2:  [97 %-99 %] 98 %   O2 Amount (l/min):     O2 Devices Device (Oxygen Therapy): room air   Weight:              Physical Exam    Lungs Respirations even and with ease. Occasional non-productive cough.   Abdomen Soft. Fundus firm, nontender.   Incision  healing well, no drainage, no erythema, no swelling, well approximated, skin glue dressing intact   Extremities 2+ edema. Nontender calves.      I reviewed the patient's new clinical results.  Lab Results (last 24 hours)       ** No results found for the last 24 hours. **            External Prenatal Results       Pregnancy Outside Results - Transcribed From Office Records - See Scanned Records For Details       Test Value Date Time    ABO  B  23    Rh  Positive  23    Antibody Screen  Negative  23    Varicella IgG       Rubella ^ Immune  23     Hgb  8.5 g/dL 23 1035       10.4 g/dL 23       11.1 g/dL 23 1025    Hct  28.3 % 23 1035       33.5 % 23    Glucose Fasting GTT       Glucose Tolerance Test  1 hour       Glucose Tolerance Test 3 hour       Gonorrhea (discrete) ^ Negative  23     Chlamydia (discrete) ^ Negative  23     RPR       VDRL       Syphilis Antibody       HBsAg ^ Negative  23     Herpes Simplex Virus PCR       Herpes Simplex VIrus Culture       HIV ^ Negative  23     Hep C RNA Quant PCR       Hep C Antibody       AFP       Group B Strep       GBS Susceptibility to Clindamycin       GBS Susceptibility to Erythromycin       Fetal Fibronectin       Genetic Testing, Maternal Blood                 Drug Screening       Test Value Date Time    Urine Drug Screen       Amphetamine Screen  Negative  23    Barbiturate Screen  Negative  23    Benzodiazepine Screen  Negative  23    Methadone Screen  Negative  23    Phencyclidine Screen  Negative  23    Opiates Screen  Negative  23    THC Screen  Negative  23    Cocaine Screen       Propoxyphene Screen       Buprenorphine Screen  Negative  23    Methamphetamine Screen       Oxycodone Screen  Negative  23    Tricyclic Antidepressants Screen  Negative  23              Legend    ^: Historical                            Assessment & Plan        Normal labor    Previous  delivery, antepartum    Previous  section      Assessment:    Alie Cleary is Day 4  post-partum  , Low Transverse   .       Plan:  Continue postpartum and postoperative plan of care. Discharge instructions provided and understanding verbalized. Return to the office in 2 weeks for postoperative check and as needed with concerns.      Plan for discharge reviewed with Dr. Rios.     This note has been signed electronically.    Deandra Jimenez, CHELSIE, APRN, CNM, RNC-OB  2023  08:12 CST

## 2023-12-01 NOTE — CASE MANAGEMENT/SOCIAL WORK
Continued Stay Note  SHONDA Jauregui     Patient Name: Alie Cleary  MRN: [id number]  Today's Date: 12/1/2023    Admit Date: 11/27/2023        Discharge Plan       Row Name 12/01/23 1010       Plan    Plan Comments Irena from CPS called saying when pt medically cleared she can leave and she will follow up with patient in the home. Mother/baby can leave together when ready.  Please inform if any other questions/concerns arise.                   Discharge Codes    No documentation.                 Expected Discharge Date and Time       Expected Discharge Date Expected Discharge Time    Dec 1, 2023               BAYRON Gonzalez

## 2023-12-01 NOTE — DISCHARGE SUMMARY
Deandra Jimenez, ALFA  [facility]  [address]  [place] [address]  Office [phone]  Fax [phone]    Discharge Summary    [facility]  Alie [facility]  : 1990  MRN: [id number]  CSN: [id number]    Date of Admission: 2023   Date of Discharge:  2023   Delivering Physician: Cali Enrique MD       Admission Diagnosis: Previous  delivery, antepartum [O34.219]  Previous  section [Z98.891]   Discharge Diagnosis: Pregnancy at 39w0d - delivered       Procedures: Primary  (LTCS) with BTL     Hospital Course  See the completed operative report for details regarding antepartum course and delivery.    Her postoperative course was unremarkable.  On POD # 4 she felt like she was ready for discharge.  She was evaluated by Dr. Rios who agreed she was able to be discharged to home.  She had no febrile morbidity. She had normal bowel and bladder function and was hemodynamically stable.  Her wound was healing well without obvious signs of infections.    Infant  female  fetus weighing 3550 g (7 lb 13.2 oz)   Apgars -  8 @ 1 minute /  9 @ 5 minutes.    Discharge labs  Lab Results   Component Value Date    WBC 13.91 (H) 2023    HGB 8.5 (L) 2023    HCT 28.3 (L) 2023     2023       Discharge Medications     Discharge Medications        New Medications        Instructions Start Date   ibuprofen 600 MG tablet  Commonly known as: ADVIL,MOTRIN   600 mg, Oral, Every 6 Hours      oxyCODONE 5 MG immediate release tablet  Commonly known as: ROXICODONE   5 mg, Oral, Every 8 Hours PRN             Continue These Medications        Instructions Start Date   pantoprazole 40 MG EC tablet  Commonly known as: Protonix   40 mg, Oral, Daily      prenatal (CLASSIC) vitamin 28-0.8 MG tablet tablet  Generic drug: prenatal vitamin   Oral, Daily               External Prenatal Results       Pregnancy Outside Results -  Transcribed From Office Records - See Scanned Records For Details       Test Value Date Time    ABO  B  11/27/23 2050    Rh  Positive  11/27/23 2050    Antibody Screen  Negative  11/27/23 2050    Varicella IgG       Rubella ^ Immune  05/25/23     Hgb  8.5 g/dL 11/28/23 1035       10.4 g/dL 11/27/23 2050       11.1 g/dL 09/29/23 1025    Hct  28.3 % 11/28/23 1035       33.5 % 11/27/23 2050    Glucose Fasting GTT       Glucose Tolerance Test 1 hour       Glucose Tolerance Test 3 hour       Gonorrhea (discrete) ^ Negative  05/25/23     Chlamydia (discrete) ^ Negative  05/25/23     RPR       VDRL       Syphilis Antibody       HBsAg ^ Negative  05/25/23     Herpes Simplex Virus PCR       Herpes Simplex VIrus Culture       HIV ^ Negative  05/25/23     Hep C RNA Quant PCR       Hep C Antibody       AFP       Group B Strep       GBS Susceptibility to Clindamycin       GBS Susceptibility to Erythromycin       Fetal Fibronectin       Genetic Testing, Maternal Blood                 Drug Screening       Test Value Date Time    Urine Drug Screen       Amphetamine Screen  Negative  11/27/23 2110    Barbiturate Screen  Negative  11/27/23 2110    Benzodiazepine Screen  Negative  11/27/23 2110    Methadone Screen  Negative  11/27/23 2110    Phencyclidine Screen  Negative  11/27/23 2110    Opiates Screen  Negative  11/27/23 2110    THC Screen  Negative  11/27/23 2110    Cocaine Screen       Propoxyphene Screen       Buprenorphine Screen  Negative  11/27/23 2110    Methamphetamine Screen       Oxycodone Screen  Negative  11/27/23 2110    Tricyclic Antidepressants Screen  Negative  11/27/23 2110              Legend    ^: Historical                            Discharge Disposition Home or Self Care   Condition on Discharge: good   Follow-up: 2 weeks with Dr. Enrique     Plan for discharge reviewed with Dr. Rios.    This note has been signed electronically.    Deandra Jimenez, DNP, APRN, CNM, RNC-OB  12/1/2023

## 2023-12-01 NOTE — CASE MANAGEMENT/SOCIAL WORK
Psych consult changed to 10 30 am, Alcides CHAPIN aware.  CPS report accepted and found out that  assigned to case is Irena Morris [phone].  Left message for Irena to call this SW to see if they will be making visit here or she will follow up in home.  Informed that pt will be going home today and psych consult will take place at 10 30am in case they were planning to come then. Will follow for return call.

## 2023-12-02 ENCOUNTER — HOSPITAL ENCOUNTER (OUTPATIENT)
Dept: LACTATION | Facility: HOSPITAL | Age: 33
Discharge: HOME | End: 2023-12-02
Payer: MEDICAID

## 2023-12-02 NOTE — LACTATION NOTE
Mother's Name: Alie   Contact Number: [phone]  G/P: /  Breastfeeding Hx: Pumped for 6 weeks with first child who is now 13 yo due to latching difficulty  Significant Medical History: Fibromyalgia  Maternal Breast Assessment: full, some firm areas palpated, cracking to left nipple noted    Infant's Name: Lars  Date of Birth: 23  Gestational age at Birth: 39w0d  Age: 5 days  Physician: Ivory Blackwell                     Reason for Visit: Ordered  follow up          Infant's Birth weight: 7-13.2 (3550g)  Previous Weight: 7-6.2 (3350g)  Wt Loss: -5.63%    Today's Weight: 7-9.4 (3445g)   Wt Loss: -2.95%    Feeding History Since Discharge/Last Lactation Appt.: Patient is breastfeeding or bottle feeding ebm every 3 hours. States her left nipple hurts and is cracking. She believes the trauma to her left nipple might have been caused early on. She is using 21 mm flanges when pumping. She last collected 2 ounces.     Past 24 Hours Voids/Stools: 6v/1s       Color of Stool: dark yellow    Pre Weight: 3460g           Left Breast: pumped while feeding infant on the right--3 ounces of slight blood tinged milk collected (bleeding from cracked nipple--informed patient it is ok to feed to infant)               Right Breast: 30 minutes--3525g--gained 65g                      Total Minutes:     30     Total Weight Gain:      65    gms!    Average Feeding Amount for Age: 45-60 ml (1.5-2 ounces) every 3 hours    Interventions: Assisted with proper positioning and deep latching. Infant latched well. Consistent deep jaw drops with audible swallows noted. Pumped left breast with manual pump while infant  on the right. 3 ounces of milk collected. Bilateral breasts softened significantly with some firmness deep into breasts palpated. Recommended patient pump when she gets home to further soften using 24 mm flange as 21 is too small. Cool gels and lanolin provided.     Education: positioning, deep latching,  signs of nutritive fees, signs of satiety, pumping, flange size (reports she is using 21 mm), feeding amounts, nipple care, breastmilk storage, s/s mastitis    Notified MD/ Orders Received: Dr Anderson notified of infant's jaundice appearance and mother's desire to check billirubin level    Feeding Plan:     Continue breastfeeding as you are using learned deep latching techniques  Pump breasts as needed to keep breasts soft    Plan of Care:    X Interventions accomplished satisfactorily, requires no further action.    Interventions require further assessment with [facility] Lactation    Interventions require further assessment with MD    Future Appointments:    Lactation: As desired by patient     Physician: Dr Blackwell on 12/11/23 at 0945 AM    Signature: Verónica Reynolds RN, IBCLC

## 2023-12-02 NOTE — PAYOR COMM NOTE
"DC HOME 12-1-23    Alie Cleary (33 y.o. Female)       Date of Birth   1990    Social Security Number       Address   [address] [address]    Home Phone   [phone]    MRN   [id number]       Shinto   Other    Marital Status   Single                            Admission Date   11/27/23    Admission Type   Elective    Admitting Provider   Corky Rios MD    Attending Provider       Department, Room/Bed   [facility] MOTHER BABY 2A, M237/1       Discharge Date   12/1/2023    Discharge Disposition   Home or Self Care    Discharge Destination                                 Attending Provider: (none)   Allergies: No Known Allergies    Isolation: None   Infection: None   Code Status: Prior    Ht: 172.7 cm (67.99\")   Wt: 88.5 kg (195 lb)    Admission Cmt: None   Principal Problem: Normal labor [O80,Z37.9]                   Active Insurance as of 11/27/2023       Primary Coverage       Payor Plan Insurance Group Employer/Plan Group    ANTHEM MEDICAID ANTHEM MEDICAID [id number]       Payor Plan Address Payor Plan Phone Number Payor Plan Fax Number Effective Dates    [address] [address] [phone]  9/1/2023 - None Entered    [facility] [id number]         Subscriber Name Subscriber Birth Date Member ID       ALIE CLEARY 1990 [id number]                     Emergency Contacts        (Rel.) Home Phone Work Phone Mobile Phone    Lorna Burgos (Mother) -- -- [phone]    EDWARD VÁZQUEZ (Friend) [phone] -- --                 Discharge Summary        Deandra Jimenez APRN at 12/01/23 0951       Attestation signed by Corky Rios MD at 12/02/23 0831    I have reviewed this documentation and agree.                                                          ALFA Pendleton  [facility]  [address]  [place] [address]  Office [phone]  Fax [phone]    Discharge Summary    [facility]  Alie " Evin  : 1990  MRN: [id number]  CSN: [id number]    Date of Admission: 2023   Date of Discharge:  2023   Delivering Physician: Cali Enrique MD       Admission Diagnosis: Previous  delivery, antepartum [O34.219]  Previous  section [Z98.891]   Discharge Diagnosis: Pregnancy at 39w0d - delivered       Procedures: Primary  (LTCS) with BTL     Hospital Course  See the completed operative report for details regarding antepartum course and delivery.    Her postoperative course was unremarkable.  On POD # 4 she felt like she was ready for discharge.  She was evaluated by Dr. Rios who agreed she was able to be discharged to home.  She had no febrile morbidity. She had normal bowel and bladder function and was hemodynamically stable.  Her wound was healing well without obvious signs of infections.    Infant  female  fetus weighing 3550 g (7 lb 13.2 oz)   Apgars -  8 @ 1 minute /  9 @ 5 minutes.    Discharge labs  Lab Results   Component Value Date    WBC 13.91 (H) 2023    HGB 8.5 (L) 2023    HCT 28.3 (L) 2023     2023       Discharge Medications     Discharge Medications        New Medications        Instructions Start Date   ibuprofen 600 MG tablet  Commonly known as: ADVIL,MOTRIN   600 mg, Oral, Every 6 Hours      oxyCODONE 5 MG immediate release tablet  Commonly known as: ROXICODONE   5 mg, Oral, Every 8 Hours PRN             Continue These Medications        Instructions Start Date   pantoprazole 40 MG EC tablet  Commonly known as: Protonix   40 mg, Oral, Daily      prenatal (CLASSIC) vitamin 28-0.8 MG tablet tablet  Generic drug: prenatal vitamin   Oral, Daily               External Prenatal Results       Pregnancy Outside Results - Transcribed From Office Records - See Scanned Records For Details       Test Value Date Time    ABO  B  23    Rh  Positive  23    Antibody Screen  Negative  23    Varicella IgG        Rubella ^ Immune  05/25/23     Hgb  8.5 g/dL 11/28/23 1035       10.4 g/dL 11/27/23 2050       11.1 g/dL 09/29/23 1025    Hct  28.3 % 11/28/23 1035       33.5 % 11/27/23 2050    Glucose Fasting GTT       Glucose Tolerance Test 1 hour       Glucose Tolerance Test 3 hour       Gonorrhea (discrete) ^ Negative  05/25/23     Chlamydia (discrete) ^ Negative  05/25/23     RPR       VDRL       Syphilis Antibody       HBsAg ^ Negative  05/25/23     Herpes Simplex Virus PCR       Herpes Simplex VIrus Culture       HIV ^ Negative  05/25/23     Hep C RNA Quant PCR       Hep C Antibody       AFP       Group B Strep       GBS Susceptibility to Clindamycin       GBS Susceptibility to Erythromycin       Fetal Fibronectin       Genetic Testing, Maternal Blood                 Drug Screening       Test Value Date Time    Urine Drug Screen       Amphetamine Screen  Negative  11/27/23 2110    Barbiturate Screen  Negative  11/27/23 2110    Benzodiazepine Screen  Negative  11/27/23 2110    Methadone Screen  Negative  11/27/23 2110    Phencyclidine Screen  Negative  11/27/23 2110    Opiates Screen  Negative  11/27/23 2110    THC Screen  Negative  11/27/23 2110    Cocaine Screen       Propoxyphene Screen       Buprenorphine Screen  Negative  11/27/23 2110    Methamphetamine Screen       Oxycodone Screen  Negative  11/27/23 2110    Tricyclic Antidepressants Screen  Negative  11/27/23 2110              Legend    ^: Historical                            Discharge Disposition Home or Self Care   Condition on Discharge: good   Follow-up: 2 weeks with Dr. Enrique     Plan for discharge reviewed with Dr. Rios.    This note has been signed electronically.    Deandra Jimenez, CHELSIE, APRN, CNM, RNC-OB  12/1/2023        Electronically signed by Corky Rios MD at 12/02/23 0861

## 2023-12-04 ENCOUNTER — PATIENT OUTREACH (OUTPATIENT)
Dept: LABOR AND DELIVERY | Facility: HOSPITAL | Age: 33
End: 2023-12-04
Payer: MEDICAID

## 2023-12-04 NOTE — OUTREACH NOTE
Motherhood [facility]  Unable to Reach       Questions/Answers      Flowsheet Row Responses   Pending Outreach Postpartum Check-in   Call Attempt First   Outcome No answer/busy, Left message, ObsEvat message sent to patient                Sera Mcnally RN  Maternity Nurse Navigator    12/4/2023, 13:26 CST

## 2023-12-05 ENCOUNTER — PATIENT OUTREACH (OUTPATIENT)
Dept: LABOR AND DELIVERY | Facility: HOSPITAL | Age: 33
End: 2023-12-05
Payer: MEDICAID

## 2023-12-05 NOTE — OUTREACH NOTE
[facility]  Unable to Reach       Questions/Answers      Flowsheet Row Responses   Pending Outreach Postpartum Check-in   Call Attempt Second   Outcome No answer/busy                Sera Mcnally RN  Maternity Nurse Navigator    12/5/2023, 11:54 CST

## 2023-12-06 ENCOUNTER — PATIENT OUTREACH (OUTPATIENT)
Dept: LABOR AND DELIVERY | Facility: HOSPITAL | Age: 33
End: 2023-12-06
Payer: MEDICAID

## 2023-12-06 NOTE — OUTREACH NOTE
[facility]  Unable to Reach       Questions/Answers      Flowsheet Row Responses   Pending Outreach Postpartum Check-in   Call Attempt Third   Outcome No answer/busy            Program forwarded to the RN  call center    Sera Mcnally RN  Maternity Nurse Navigator    12/6/2023, 10:03 CST

## 2023-12-13 ENCOUNTER — PATIENT OUTREACH (OUTPATIENT)
Dept: CALL CENTER | Facility: HOSPITAL | Age: 33
End: 2023-12-13
Payer: MEDICAID

## 2023-12-13 NOTE — OUTREACH NOTE
Motherhood Connection Survey      Flowsheet Row Responses   [facility] facility patient discharged from? [place]   Week 1 attempt successful? No   Unsuccessful attempts Attempt 2   Reschedule Tomorrow              Vero LARSEN - Registered Nurse

## 2023-12-13 NOTE — OUTREACH NOTE
Motherhood Connection Survey      Flowsheet Row Responses   [facility] facility patient discharged from? [place]   Week 1 attempt successful? No   Unsuccessful attempts Attempt 1   Reschedule Today              Vero LARSEN - Registered Nurse

## 2023-12-14 ENCOUNTER — POSTPARTUM VISIT (OUTPATIENT)
Dept: OBSTETRICS AND GYNECOLOGY | Facility: CLINIC | Age: 33
End: 2023-12-14
Payer: MEDICAID

## 2023-12-14 ENCOUNTER — PATIENT OUTREACH (OUTPATIENT)
Dept: CALL CENTER | Facility: HOSPITAL | Age: 33
End: 2023-12-14
Payer: MEDICAID

## 2023-12-14 VITALS
DIASTOLIC BLOOD PRESSURE: 76 MMHG | HEIGHT: 68 IN | BODY MASS INDEX: 25.76 KG/M2 | SYSTOLIC BLOOD PRESSURE: 122 MMHG | WEIGHT: 170 LBS

## 2023-12-14 DIAGNOSIS — K59.09 OTHER CONSTIPATION: ICD-10-CM

## 2023-12-14 RX ORDER — DOCUSATE SODIUM 100 MG/1
100 CAPSULE, LIQUID FILLED ORAL 2 TIMES DAILY
Qty: 60 CAPSULE | Refills: 1 | Status: SHIPPED | OUTPATIENT
Start: 2023-12-14

## 2023-12-14 NOTE — OUTREACH NOTE
Motherhood Connection Survey      Flowsheet Row Responses   [facility] patient discharged from? [place]   Week 1 attempt successful? Yes   Call start time 1523   Call end time 1546   Baby sex Girl    discharged home with mother? Yes   Baby sex Girl   Delivery type    Emotional state Acceptance   Family support Yes   Do you have all necessary resources to care for you and your baby?  Yes   Have members of your household adjusted to your baby? Yes   Did you have any problems with pre-eclampsia during this pregnancy? No   Did you have blood glucose issues during this pregnancy No   Lochia amount Light   Lochia per patient report Rubra   Did you have an episiotomy/tear/abdominal incision? Yes   Feeding Method Combination   Frequency nurses on demand, has MBM supplements   Duration q 2-3 hrs   Supplementing Breast Milk   Frequency q 2-3 hrs   Nipple Condition Yes   Nursing Interventions Lactation education provided   Signs baby is ready to eat Rooting, Crying   Number of wet diapers x 24 hours 7   Last BM x 24 hours 2-3   Umbilical Cord No reported signs or symptoms   Umbilical cord comments cord off   Where does the baby usually sleep? Bassinet   Are there stuffed animals, toys, pillows, quilts, blankets, wedges, positioners, bumpers or other loose bedding in the infant's sleeping environment? No   Does the baby ever share a sleep surface in a bed, couch, recliner or other? No   What position do you lay your baby down to sleep? Back   Are you and/or other caregivers smoking inside or outside the baby's home? No   Mom appointment comments: PP f/u done   Baby appointment comments: Peds visit x1, gaining weight   Additional comments eager to eat   Additional comments no visible sign of soreness, however pt states nipples sore when feeding, has applied cool compresses, air drying nipples. lanolin oil, advised mother to avoid yeast breads and concentrated sugars to prevent thrush, pt has been consulting  with  LC   Call completed? Yes   How satisfied were you with the Motherhood Connection Program? 5              Vero LARSEN - Registered Nurse

## 2023-12-14 NOTE — PROGRESS NOTES
Cali Enrique MD  [facility]  [address]  [place] [address]  Office [phone]  Fax [phone]      [facility]  Alie Cleary  : 1990  MRN: [id number]    Subjective   Subjective     Chief Complaint   Patient presents with    Postpartum Care     Patient here for postpartum visit. Delivered via repeat  with tubal on 23. Denies questions or concerns.       History of Present Illness  Postpartum Visit  Patient is here for a postpartum visit. She is 2 weeks postpartum following a low cervical transverse  section.     Pregnancy complicated by:   39 weeks 0 days gestation  Labor  H/o  delivery, desires repeat with tubal  Late transfer of prenatal care  Double nuchal cord   Anhydramnios  S/p repeat low transverse  section with bilateral tubal ligation    Postpartum course has been uncomplicated.   Baby's course has been uncomplicated.     Baby is feeding by breast & formula. Bleeding staining only. Bowel function is normal but feels constipation like - wants to take something for this. Bladder function is normal. Patient is not sexually active. Contraception method is tubal ligation. Postpartum depression screening: positive. No SI/HI.     Review of Systems   Constitutional:  Negative for activity change, appetite change, chills, fatigue and fever.   Respiratory:  Negative for cough and shortness of breath.    Cardiovascular:  Negative for chest pain and leg swelling.   Gastrointestinal:  Negative for abdominal pain, constipation, diarrhea, nausea and vomiting.   Genitourinary:  Negative for decreased urine volume, dysuria, frequency, menstrual problem, pelvic pain, urgency, vaginal bleeding, vaginal discharge and vaginal pain.   Psychiatric/Behavioral:  Negative for decreased concentration, dysphoric mood, self-injury, sleep disturbance and suicidal ideas. The patient is not nervous/anxious.    All other systems reviewed and are negative.    "      Objective    Objective     Vitals:   Visit Vitals  /76   Ht 172.7 cm (67.99\")   Wt 77.1 kg (170 lb)   Breastfeeding Yes   BMI 25.86 kg/m²        Physical Exam  Vitals and nursing note reviewed.   Constitutional:       Appearance: Normal appearance.   HENT:      Head: Normocephalic and atraumatic.   Eyes:      General: No scleral icterus.     Conjunctiva/sclera: Conjunctivae normal.   Abdominal:      General: There is no distension.      Palpations: Abdomen is soft.      Tenderness: There is no abdominal tenderness. There is no guarding or rebound.      Comments: Incisions: clean and dry, appears to be healing well, no erythema/bleeding/discharge from incisions   Neurological:      Mental Status: She is alert.           Result Review    Postpartum Depression: High Risk (2023)    [place]  Depression Scale     Last EPDS Total Score: 12     Last EPDS Self Harm Result: Not on file       Tissue Pathology Exam (2023 22:06)   (1)  Placenta, delivery:           Third trimester placenta, 741 g.           Focal infarction, 0.8 cm in greatest dimension.           Mild intervillous fibrin deposition with focal microcalcifications.           Fetal membranes without diagnostic abnormality.           Three-vessel umbilical cord without inflammation.     (2)   Fallopian tube, \"left\", sterilization.           Complete cross-section of unremarkable fallopian tube.     (3)   Fallopian tube, \"right\", sterilization.           Complete cross-section of unremarkable fallopian tube.        Assessment & Plan   Assessment / Plan     Diagnoses and all orders for this visit:    1. Postpartum care following  delivery (Primary)    2. Postpartum depression  Comments:  declining medication at this time, feels ok right now    3. Other constipation  -     docusate sodium (Colace) 100 MG capsule; Take 1 capsule by mouth 2 (Two) Times a Day.  Dispense: 60 capsule; Refill: 1      Meeting postpartum " milestones.      Return in about 4 weeks (around 1/11/2024) for postpartum.      Cali Enrique MD

## 2023-12-26 PROCEDURE — 87637 SARSCOV2&INF A&B&RSV AMP PRB: CPT | Performed by: NURSE PRACTITIONER

## 2024-01-11 ENCOUNTER — POSTPARTUM VISIT (OUTPATIENT)
Dept: OBSTETRICS AND GYNECOLOGY | Facility: CLINIC | Age: 34
End: 2024-01-11
Payer: MEDICAID

## 2024-01-11 VITALS
HEIGHT: 68 IN | DIASTOLIC BLOOD PRESSURE: 84 MMHG | WEIGHT: 169 LBS | SYSTOLIC BLOOD PRESSURE: 128 MMHG | BODY MASS INDEX: 25.61 KG/M2

## 2024-01-11 NOTE — PROGRESS NOTES
Cali Enrique MD  [facility]  [address]  [place] [address]  Office [phone]  Fax [phone]      [facility]  Alie Cleary  : 1990  MRN: [id number]    Subjective   Subjective     Chief Complaint   Patient presents with    Postpartum Care     Patient here for postpartum visit. Delivered  on 23 at 39 weeks. No other question or concerns. Contraception: tubal with .   PPD score- 0.        History of Present Illness  Postpartum Visit  Patient is here for a postpartum visit. She is 6 weeks postpartum following a low cervical transverse  section with tubal.     Pregnancy complicated by:  39 weeks 0 days gestation  Labor  H/o  delivery, desires repeat with tubal  Late transfer of prenatal care  Double nuchal cord   Anhydramnios  S/p repeat low transverse  section with bilateral tubal ligation    Postpartum course has been uncomplicated.   Baby's course has been uncomplicated.     Baby is feeding by breast. Bleeding no bleeding. Bowel function is normal. Bladder function is normal. Patient is not sexually active. Contraception method is tubal ligation. Postpartum depression screening: negative.    Review of Systems   Constitutional:  Negative for activity change, appetite change, chills, fatigue and fever.   Respiratory:  Negative for cough and shortness of breath.    Cardiovascular:  Negative for chest pain and leg swelling.   Gastrointestinal:  Negative for abdominal pain, constipation, diarrhea, nausea and vomiting.   Genitourinary:  Negative for decreased urine volume, dysuria, frequency, menstrual problem, pelvic pain, urgency, vaginal bleeding, vaginal discharge and vaginal pain.   Psychiatric/Behavioral:  Negative for decreased concentration, dysphoric mood, self-injury, sleep disturbance and suicidal ideas. The patient is not nervous/anxious.    All other systems reviewed and are negative.         Objective    Objective  "    Vitals:   Visit Vitals  /84   Ht 172.7 cm (68\")   Wt 76.7 kg (169 lb)   Breastfeeding Yes   BMI 25.70 kg/m²        Physical Exam  Vitals and nursing note reviewed.   Constitutional:       Appearance: Normal appearance.   HENT:      Head: Normocephalic and atraumatic.   Eyes:      General: No scleral icterus.     Conjunctiva/sclera: Conjunctivae normal.   Abdominal:      General: There is no distension.      Palpations: Abdomen is soft.      Tenderness: There is no abdominal tenderness. There is no guarding or rebound.      Comments: Incisions: clean and dry, appears to be healing well, no erythema/bleeding/discharge from incisions   Neurological:      Mental Status: She is alert.           Result Review    Postpartum Depression: Low Risk  (2024)    [place]  Depression Scale     Last EPDS Total Score: 0     Last EPDS Self Harm Result: Not on file   Recent Concern: Postpartum Depression - High Risk (2023)    [place]  Depression Scale     Last EPDS Total Score: 12     Last EPDS Self Harm Result: Not on file       Tissue Pathology Exam (2023 22:06)   (1)  Placenta, delivery:           Third trimester placenta, 741 g.           Focal infarction, 0.8 cm in greatest dimension.           Mild intervillous fibrin deposition with focal microcalcifications.           Fetal membranes without diagnostic abnormality.           Three-vessel umbilical cord without inflammation.     (2)   Fallopian tube, \"left\", sterilization.           Complete cross-section of unremarkable fallopian tube.     (3)   Fallopian tube, \"right\", sterilization.           Complete cross-section of unremarkable fallopian tube.        Assessment & Plan   Assessment / Plan     Diagnoses and all orders for this visit:    1. Postpartum care following  delivery (Primary)      Meeting postpartum milestones.     Return in about 3 months (around 2024), or if symptoms worsen or fail to improve, for " annual.      Cali Enrique MD

## (undated) DEVICE — SUT GUT PLAIN TPR PT 2/0 27IN 53T

## (undated) DEVICE — SUT MNCRYL 0/0 CTX 36IN Y398H

## (undated) DEVICE — APPL CHLORAPREP HI/LITE 26ML ORNG

## (undated) DEVICE — CVR HNDL LIGHT RIGID

## (undated) DEVICE — PATIENT RETURN ELECTRODE, SINGLE-USE, CONTACT QUALITY MONITORING, ADULT, WITH 15 FT (4.5 M) CORD. FOR PATIENTS WEIGHING OVER 33LBS. (15KG): Brand: MEGADYNE

## (undated) DEVICE — SPNG GZ WOVN 4X4IN 12PLY 10/BX STRL

## (undated) DEVICE — SUT GUT PLN 0 CT3 27IN N864H

## (undated) DEVICE — PAD C-SECTION: Brand: MEDLINE INDUSTRIES, INC.

## (undated) DEVICE — 3M™ MEDIPORE™ H SOFT CLOTH SURGICAL TAPE 2868, 8 INCH X 10 YARD (20,3CM X 9,1M), 6 ROLLS/CASE: Brand: 3M™ MEDIPORE™

## (undated) DEVICE — SUT VIC 0 CTX 36IN J978H

## (undated) DEVICE — ADHS LIQ MASTISOL 2/3ML

## (undated) DEVICE — TBG PENCL TELESCP MEGADYNE SMOKE EVAC 10FT

## (undated) DEVICE — SUT VIC RAPD SZ 2/0 36IN CT1 VR945 BX/12

## (undated) DEVICE — LARGE, DISPOSABLE C-SECTION RETRACTOR: Brand: ALEXIS ® O C-SECTION PROTECTOR/RETRACTOR

## (undated) DEVICE — GLV SURG SENSICARE SLT PF LF 8 STRL

## (undated) DEVICE — Device

## (undated) DEVICE — 3M™ STERI-STRIP™ REINFORCED ADHESIVE SKIN CLOSURES, R1547, 1/2 IN X 4 IN (12 MM X 100 MM), 6 STRIPS/ENVELOPE: Brand: 3M™ STERI-STRIP™

## (undated) DEVICE — SUT VIC RAPD SZ 3/0 27IN PS1 VR935